# Patient Record
Sex: MALE | Race: WHITE | Employment: FULL TIME | URBAN - METROPOLITAN AREA
[De-identification: names, ages, dates, MRNs, and addresses within clinical notes are randomized per-mention and may not be internally consistent; named-entity substitution may affect disease eponyms.]

---

## 2018-02-15 PROBLEM — K35.80 ACUTE APPENDICITIS: Status: ACTIVE | Noted: 2018-02-15

## 2020-02-20 ENCOUNTER — HOSPITAL ENCOUNTER (EMERGENCY)
Age: 30
Discharge: HOME | End: 2020-02-20

## 2020-02-20 VITALS
TEMPERATURE: 97.7 F | HEART RATE: 94 BPM | DIASTOLIC BLOOD PRESSURE: 76 MMHG | SYSTOLIC BLOOD PRESSURE: 128 MMHG | OXYGEN SATURATION: 100 % | RESPIRATION RATE: 20 BRPM

## 2020-02-20 LAB
RAPID INFLUENZA  B AGN: NEGATIVE
RAPID INFLUENZA A AGN: NEGATIVE
S PYO AG THROAT QL: NEGATIVE

## 2020-02-20 PROCEDURE — 87804 INFLUENZA ASSAY W/OPTIC: CPT

## 2020-02-20 PROCEDURE — 87880 STREP A ASSAY W/OPTIC: CPT

## 2020-02-20 PROCEDURE — 99283 EMERGENCY DEPT VISIT LOW MDM: CPT

## 2020-02-20 PROCEDURE — 87081 CULTURE SCREEN ONLY: CPT

## 2020-02-20 PROCEDURE — 6370000000 HC RX 637 (ALT 250 FOR IP): Performed by: NURSE PRACTITIONER

## 2020-02-20 PROCEDURE — 6360000002 HC RX W HCPCS: Performed by: NURSE PRACTITIONER

## 2020-02-20 RX ORDER — NAPROXEN 500 MG/1
500 TABLET ORAL 2 TIMES DAILY
Qty: 20 TABLET | Refills: 0 | Status: SHIPPED | OUTPATIENT
Start: 2020-02-20 | End: 2020-03-01

## 2020-02-20 RX ORDER — DEXAMETHASONE SODIUM PHOSPHATE 10 MG/ML
10 INJECTION INTRAMUSCULAR; INTRAVENOUS ONCE
Status: COMPLETED | OUTPATIENT
Start: 2020-02-20 | End: 2020-02-20

## 2020-02-20 RX ORDER — AMOXICILLIN 500 MG/1
500 CAPSULE ORAL ONCE
Status: COMPLETED | OUTPATIENT
Start: 2020-02-20 | End: 2020-02-20

## 2020-02-20 RX ORDER — AMOXICILLIN 500 MG/1
500 CAPSULE ORAL 2 TIMES DAILY
Qty: 20 CAPSULE | Refills: 0 | Status: SHIPPED | OUTPATIENT
Start: 2020-02-20 | End: 2020-03-01

## 2020-02-20 RX ADMIN — DEXAMETHASONE SODIUM PHOSPHATE 10 MG: 10 INJECTION, SOLUTION INTRAMUSCULAR; INTRAVENOUS at 16:23

## 2020-02-20 RX ADMIN — AMOXICILLIN 500 MG: 500 CAPSULE ORAL at 16:23

## 2020-02-20 ASSESSMENT — ENCOUNTER SYMPTOMS
SHORTNESS OF BREATH: 0
COUGH: 0
SORE THROAT: 1
RHINORRHEA: 0
ABDOMINAL PAIN: 0
COLOR CHANGE: 0

## 2020-02-20 ASSESSMENT — PAIN SCALES - GENERAL: PAINLEVEL_OUTOF10: 3

## 2020-02-21 NOTE — ED PROVIDER NOTES
Evaluated by [id number] [facility] Provider          [facility] ED  EMERGENCY DEPARTMENT ENCOUNTER        Pt Name: Pavan Garcia  MRN: [id number]  [place] 1990  Dateof evaluation: 2/20/2020  Provider: MARQUIS Fischer CNP  PCP: No primary care provider on file. ED Attending: No att. providers found    [facility]       Chief Complaint   Patient presents with    Pharyngitis       HISTORY OF PRESENTILLNESS   (Location/Symptom, Timing/Onset, Context/Setting, Quality, Duration, Modifying Factors, Severity)  Note limiting factors. Pavan Garcia is a 34 y.o. male for sore throat. Onset was 2 days. Duration has been since the onset. Context includes patient reports that he has had a sore throat for the last 2 days. He denies any fever or cough. Alleviating factors include nothing. Aggravating factors include nothing. Pain is 3/10. Nothing has been used for pain today. Nursing Notes were all reviewed and agreed with or any disagreements were addressed  in the HPI. REVIEW OF SYSTEMS    (2-9 systems for level 4, 10 or more for level 5)     Review of Systems   Constitutional: Negative for fever. HENT: Positive for sore throat. Negative for congestion and rhinorrhea. Respiratory: Negative for cough and shortness of breath. Cardiovascular: Negative for chest pain. Gastrointestinal: Negative for abdominal pain. Genitourinary: Negative for decreased urine volume and difficulty urinating. Musculoskeletal: Negative for arthralgias and myalgias. Skin: Negative for color change and rash. Neurological: Negative for dizziness and light-headedness. Psychiatric/Behavioral: Negative for agitation. All other systems reviewed and are negative. Positives and Pertinent negatives as per HPI. Except as noted above in the ROS, all other systems were reviewed and negative.        PAST MEDICAL HISTORY     Past Medical History:   Diagnosis Date    Mood disorder ([address] Mouth/Throat:      Pharynx: Pharyngeal swelling, oropharyngeal exudate and posterior oropharyngeal erythema present. Tonsils: Swellin+ on the right. 2+ on the left. Neck:      Musculoskeletal: Normal range of motion. Cardiovascular:      Rate and Rhythm: Normal rate. Pulmonary:      Effort: Pulmonary effort is normal. No respiratory distress. Abdominal:      General: There is no distension. Palpations: Abdomen is soft. Musculoskeletal: Normal range of motion. Skin:     General: Skin is warm and dry. Neurological:      Mental Status: He is alert and oriented to person, place, and time. DIAGNOSTIC RESULTS   LABS:    Labs Reviewed   STREP SCREEN GROUP A THROAT    Narrative:     Performed at:  [facility] [address],  [place], [facility]   Phone [phone]   RAPID INFLUENZA A/B ANTIGENS    Narrative:     Performed at:  [facility] [address],  [place], [facility]   Phone [phone]   CULTURE, BETA STREP CONFIRM PLATES       All other labs werewithin normal range or not returned as of this dictation. EKG: All EKG's are interpreted by the Emergency Department Physician who either signs or Co-signs this chart in the absence of acardiologist.  Please see their note for interpretation of EKG. RADIOLOGY:           Interpretation per the Radiologist below, if available at the time of this note:    No orders to display     No results found.       PROCEDURES   Unless otherwise noted below, none     Procedures     CRITICAL CARE TIME   N/A    CONSULTS:  None      EMERGENCYDEPARTMENT COURSE and DIFFERENTIAL DIAGNOSIS/MDM:   Vitals:    Vitals:    20 1525   BP: 128/76   Pulse: 94   Resp: 20   Temp: 97.7 °F (36.5 °C)   SpO2: 100%       Patient was given the following medications:  Medications   dexamethasone (DECADRON) injection 10 mg (10 mg Oral Given 20 1623)   amoxicillin (AMOXIL)

## 2020-02-22 LAB — S PYO THROAT QL CULT: NORMAL

## 2020-04-21 ENCOUNTER — HOSPITAL ENCOUNTER (EMERGENCY)
Age: 30
Discharge: HOME | End: 2020-04-21

## 2020-04-21 VITALS
TEMPERATURE: 98.9 F | DIASTOLIC BLOOD PRESSURE: 82 MMHG | OXYGEN SATURATION: 99 % | WEIGHT: 185 LBS | BODY MASS INDEX: 26.48 KG/M2 | RESPIRATION RATE: 16 BRPM | HEART RATE: 72 BPM | SYSTOLIC BLOOD PRESSURE: 119 MMHG | HEIGHT: 70 IN

## 2020-04-21 PROCEDURE — 6360000002 HC RX W HCPCS: Performed by: NURSE PRACTITIONER

## 2020-04-21 PROCEDURE — 90471 IMMUNIZATION ADMIN: CPT | Performed by: NURSE PRACTITIONER

## 2020-04-21 PROCEDURE — 6370000000 HC RX 637 (ALT 250 FOR IP): Performed by: NURSE PRACTITIONER

## 2020-04-21 PROCEDURE — 90715 TDAP VACCINE 7 YRS/> IM: CPT | Performed by: NURSE PRACTITIONER

## 2020-04-21 PROCEDURE — 99282 EMERGENCY DEPT VISIT SF MDM: CPT

## 2020-04-21 PROCEDURE — 65220 REMOVE FOREIGN BODY FROM EYE: CPT

## 2020-04-21 RX ORDER — ERYTHROMYCIN 5 MG/G
OINTMENT OPHTHALMIC
Qty: 1 TUBE | Refills: 0 | Status: SHIPPED | OUTPATIENT
Start: 2020-04-21 | End: 2020-04-21 | Stop reason: SDUPTHER

## 2020-04-21 RX ORDER — ERYTHROMYCIN 5 MG/G
OINTMENT OPHTHALMIC
Qty: 1 TUBE | Refills: 0 | Status: SHIPPED | OUTPATIENT
Start: 2020-04-21

## 2020-04-21 RX ORDER — ERYTHROMYCIN 5 MG/G
OINTMENT OPHTHALMIC ONCE
Status: COMPLETED | OUTPATIENT
Start: 2020-04-21 | End: 2020-04-21

## 2020-04-21 RX ADMIN — ERYTHROMYCIN: 5 OINTMENT OPHTHALMIC at 21:13

## 2020-04-21 RX ADMIN — TETANUS TOXOID, REDUCED DIPHTHERIA TOXOID AND ACELLULAR PERTUSSIS VACCINE, ADSORBED 0.5 ML: 5; 2.5; 8; 8; 2.5 SUSPENSION INTRAMUSCULAR at 21:13

## 2020-04-21 RX ADMIN — FLUORESCEIN SODIUM 1 MG: 1 STRIP OPHTHALMIC at 20:40

## 2020-04-21 ASSESSMENT — VISUAL ACUITY
OS: 20/13
OU: 20/10
OD: 20/13

## 2020-04-21 ASSESSMENT — ENCOUNTER SYMPTOMS
SHORTNESS OF BREATH: 0
SORE THROAT: 0
ABDOMINAL PAIN: 0
COLOR CHANGE: 0
RHINORRHEA: 0

## 2020-04-22 NOTE — ED PROVIDER NOTES
Evaluated by [id number] [facility] Provider          [facility] ED  EMERGENCY DEPARTMENT ENCOUNTER        Pt Name: Yosef Savage  MRN: [id number]  [place] 1990  Dateof evaluation: 4/21/2020  Provider: MARQUIS Blancas CNP  PCP: No primary care provider on file. ED Attending: No att. providers found    CHIEF COMPLAINT       Chief Complaint   Patient presents with    Foreign Body in Eye     patient states that he has metal in his left eye from cutting an exhaust off his car       HISTORY OF PRESENTILLNESS   (Location/Symptom, Timing/Onset, Context/Setting, Quality, Duration, Modifying Factors, Severity)  Note limiting factors. Yosef Savage is a 34 y.o. male for concern for corneal left foreign body. Onset was yesterday. Duration has been since the onset. Context includes patient thinks that he has a piece of metal in his left eye from yesterday. Patient states that he had goggles on however the door was open and the wind was blowing. Patient is unsure about his tetanus status. Alleviating factors include nothing. Aggravating factors include nothing. Pain is 0/10. Nothing has been used for pain today. Nursing Notes were all reviewed and agreed with or any disagreements were addressed  in the HPI. REVIEW OF SYSTEMS    (2-9 systems for level 4, 10 or more for level 5)     Review of Systems   Constitutional: Negative for fever. HENT: Negative for congestion, rhinorrhea and sore throat. Inserting for a left corneal foreign body   Respiratory: Negative for shortness of breath. Cardiovascular: Negative for chest pain. Gastrointestinal: Negative for abdominal pain. Genitourinary: Negative for decreased urine volume and difficulty urinating. Musculoskeletal: Negative for arthralgias and myalgias. Skin: Negative for color change and rash. Neurological: Negative for dizziness and light-headedness. Psychiatric/Behavioral: Negative for agitation.    All other systems reviewed and are negative. Positives and Pertinent negatives as per HPI. Except as noted above in the ROS, all other systems were reviewed and negative. PAST MEDICAL HISTORY     Past Medical History:   Diagnosis Date    Mood disorder [facility])          SURGICAL HISTORY       Past Surgical History:   Procedure Laterality Date    APPENDECTOMY  t'    Laparoscopic Appendectomy         CURRENT MEDICATIONS       Discharge Medication List as of 4/21/2020  9:11 PM      CONTINUE these medications which have NOT CHANGED    Details   naproxen (NAPROSYN) 500 MG tablet Take 1 tablet by mouth 2 times daily for 20 doses, Disp-20 tablet, R-0Print               ALLERGIES     Patient has no known allergies. FAMILY HISTORY     History reviewed. No pertinent family history.        SOCIAL HISTORY       Social History     Socioeconomic History    Marital status: Single     Spouse name: None    Number of children: None    Years of education: None    Highest education level: None   Occupational History    None   Social Needs    Financial resource strain: None    Food insecurity     Worry: None     Inability: None    Transportation needs     Medical: None     Non-medical: None   Tobacco Use    Smoking status: Current Every Day Smoker     Packs/day: 1.00    Smokeless tobacco: Never Used   Substance and Sexual Activity    Alcohol use: No    Drug use: Yes     Types: Marijuana    Sexual activity: Yes     Partners: Female   Lifestyle    Physical activity     Days per week: None     Minutes per session: None    Stress: None   Relationships    Social connections     Talks on phone: None     Gets together: None     Attends Catholic service: None     Active member of club or organization: None     Attends meetings of clubs or organizations: None     Relationship status: None    Intimate partner violence     Fear of current or ex partner: None     Emotionally abused: None     Physically abused: None

## 2022-03-15 ENCOUNTER — HOSPITAL ENCOUNTER (EMERGENCY)
Age: 32
Discharge: HOME | End: 2022-03-16
Payer: COMMERCIAL

## 2022-03-15 VITALS
HEIGHT: 69 IN | WEIGHT: 175 LBS | BODY MASS INDEX: 25.92 KG/M2 | HEART RATE: 94 BPM | TEMPERATURE: 99 F | OXYGEN SATURATION: 97 % | SYSTOLIC BLOOD PRESSURE: 113 MMHG | RESPIRATION RATE: 18 BRPM | DIASTOLIC BLOOD PRESSURE: 61 MMHG

## 2022-03-15 DIAGNOSIS — N31.9 NEUROGENIC BLADDER: ICD-10-CM

## 2022-03-15 DIAGNOSIS — N30.01 ACUTE CYSTITIS WITH HEMATURIA: Primary | ICD-10-CM

## 2022-03-15 LAB
A/G RATIO: 1.6 (ref 1.1–2.2)
ALBUMIN SERPL-MCNC: 4.7 G/DL (ref 3.4–5)
ALP BLD-CCNC: 83 U/L (ref 40–129)
ALT SERPL-CCNC: <5 U/L (ref 10–40)
ANION GAP SERPL CALCULATED.3IONS-SCNC: 12 MMOL/L (ref 3–16)
AST SERPL-CCNC: 9 U/L (ref 15–37)
BACTERIA: ABNORMAL /HPF
BASOPHILS ABSOLUTE: 0 K/UL (ref 0–0.2)
BASOPHILS RELATIVE PERCENT: 0.2 %
BILIRUB SERPL-MCNC: 0.9 MG/DL (ref 0–1)
BILIRUBIN URINE: NEGATIVE
BLOOD, URINE: ABNORMAL
BUN BLDV-MCNC: 12 MG/DL (ref 7–20)
CALCIUM SERPL-MCNC: 9.6 MG/DL (ref 8.3–10.6)
CHLORIDE BLD-SCNC: 103 MMOL/L (ref 99–110)
CLARITY: ABNORMAL
CO2: 22 MMOL/L (ref 21–32)
COLOR: YELLOW
CREAT SERPL-MCNC: 1 MG/DL (ref 0.9–1.3)
EOSINOPHILS ABSOLUTE: 0 K/UL (ref 0–0.6)
EOSINOPHILS RELATIVE PERCENT: 0.2 %
GFR AFRICAN AMERICAN: >60
GFR NON-AFRICAN AMERICAN: >60
GLUCOSE BLD-MCNC: 101 MG/DL (ref 70–99)
GLUCOSE URINE: NEGATIVE MG/DL
HCT VFR BLD CALC: 40.6 % (ref 40.5–52.5)
HEMOGLOBIN: 13.4 G/DL (ref 13.5–17.5)
KETONES, URINE: ABNORMAL MG/DL
LACTIC ACID, SEPSIS: 1.3 MMOL/L (ref 0.4–1.9)
LEUKOCYTE ESTERASE, URINE: ABNORMAL
LYMPHOCYTES ABSOLUTE: 0.5 K/UL (ref 1–5.1)
LYMPHOCYTES RELATIVE PERCENT: 5.3 %
MCH RBC QN AUTO: 28.3 PG (ref 26–34)
MCHC RBC AUTO-ENTMCNC: 32.9 G/DL (ref 31–36)
MCV RBC AUTO: 86 FL (ref 80–100)
MICROSCOPIC EXAMINATION: YES
MONOCYTES ABSOLUTE: 0.1 K/UL (ref 0–1.3)
MONOCYTES RELATIVE PERCENT: 0.9 %
MUCUS: ABNORMAL /LPF
NEUTROPHILS ABSOLUTE: 8.3 K/UL (ref 1.7–7.7)
NEUTROPHILS RELATIVE PERCENT: 93.4 %
NITRITE, URINE: POSITIVE
PDW BLD-RTO: 17.7 % (ref 12.4–15.4)
PH UA: 7 (ref 5–8)
PLATELET # BLD: 237 K/UL (ref 135–450)
PMV BLD AUTO: 8.2 FL (ref 5–10.5)
POTASSIUM REFLEX MAGNESIUM: 3.8 MMOL/L (ref 3.5–5.1)
PROTEIN UA: NEGATIVE MG/DL
RBC # BLD: 4.72 M/UL (ref 4.2–5.9)
RBC UA: ABNORMAL /HPF (ref 0–4)
SODIUM BLD-SCNC: 137 MMOL/L (ref 136–145)
SPECIFIC GRAVITY UA: <=1.005 (ref 1–1.03)
TOTAL PROTEIN: 7.7 G/DL (ref 6.4–8.2)
URINE TYPE: ABNORMAL
UROBILINOGEN, URINE: 0.2 E.U./DL
WBC # BLD: 8.9 K/UL (ref 4–11)
WBC UA: ABNORMAL /HPF (ref 0–5)

## 2022-03-15 PROCEDURE — 6370000000 HC RX 637 (ALT 250 FOR IP): Performed by: PHYSICIAN ASSISTANT

## 2022-03-15 PROCEDURE — 83605 ASSAY OF LACTIC ACID: CPT

## 2022-03-15 PROCEDURE — 87150 DNA/RNA AMPLIFIED PROBE: CPT

## 2022-03-15 PROCEDURE — 87088 URINE BACTERIA CULTURE: CPT

## 2022-03-15 PROCEDURE — 81001 URINALYSIS AUTO W/SCOPE: CPT

## 2022-03-15 PROCEDURE — 87186 SC STD MICRODIL/AGAR DIL: CPT

## 2022-03-15 PROCEDURE — 99284 EMERGENCY DEPT VISIT MOD MDM: CPT

## 2022-03-15 PROCEDURE — 85025 COMPLETE CBC W/AUTO DIFF WBC: CPT

## 2022-03-15 PROCEDURE — 87086 URINE CULTURE/COLONY COUNT: CPT

## 2022-03-15 PROCEDURE — 80053 COMPREHEN METABOLIC PANEL: CPT

## 2022-03-15 PROCEDURE — 36415 COLL VENOUS BLD VENIPUNCTURE: CPT

## 2022-03-15 PROCEDURE — 87040 BLOOD CULTURE FOR BACTERIA: CPT

## 2022-03-15 RX ORDER — ACETAMINOPHEN 500 MG
1000 TABLET ORAL ONCE
Status: COMPLETED | OUTPATIENT
Start: 2022-03-15 | End: 2022-03-15

## 2022-03-15 RX ORDER — LIDOCAINE HYDROCHLORIDE 20 MG/ML
JELLY TOPICAL PRN
Status: DISCONTINUED | OUTPATIENT
Start: 2022-03-15 | End: 2022-03-16 | Stop reason: HOSPADM

## 2022-03-15 RX ORDER — CEPHALEXIN 500 MG/1
500 CAPSULE ORAL 4 TIMES DAILY
Qty: 28 CAPSULE | Refills: 0 | Status: SHIPPED | OUTPATIENT
Start: 2022-03-15 | End: 2022-03-22

## 2022-03-15 RX ORDER — CEPHALEXIN 500 MG/1
500 CAPSULE ORAL ONCE
Status: COMPLETED | OUTPATIENT
Start: 2022-03-15 | End: 2022-03-15

## 2022-03-15 RX ADMIN — CEPHALEXIN 500 MG: 500 CAPSULE ORAL at 23:30

## 2022-03-15 RX ADMIN — ACETAMINOPHEN 1000 MG: 500 TABLET ORAL at 20:19

## 2022-03-15 RX ADMIN — LIDOCAINE HYDROCHLORIDE: 20 JELLY TOPICAL at 20:47

## 2022-03-15 ASSESSMENT — PAIN SCALES - GENERAL: PAINLEVEL_OUTOF10: 10

## 2022-03-15 NOTE — ED PROVIDER NOTES
[address] ED  EMERGENCY DEPARTMENT ENCOUNTER        Pt Name: Shayne Salazar  MRN: [id number]  [place] 1990  Date of evaluation: 3/15/2022  Provider: DALLAS Craig  PCP: None Provider    This patient was not seen and evaluated by the attending physician No att. providers found. I have evaluated this patient. My supervising physician was available for consultation. CHIEF COMPLAINT       Chief Complaint   Patient presents with    Hematuria     pt states spinal cord injury 9/2021 has cath since, started having blood in urine last night. pt states temp 102 today, took tylenol now 100.7       HISTORY OF PRESENT ILLNESS   (Location/Symptom, Timing/Onset, Context/Setting, Quality, Duration, Modifying Factors, Severity)  Note limiting factors. Shayne Salazar is a 32 y.o. male with past medical history of traumatic closed fracture of C2, tetraplegia, status post cervical spine fusion, lower extremity VTE, neurogenic bladder who presents via private vehicle from his home for evaluation of hematuria. Patient started having bright red bleeding from his Sandoval catheter yesterday. He had his catheter changed however no catheter was put in secondary to pain. He started having fevers today, T-max 102. He has been with body aches and chills and today nausea and emesis x1. He denies any chest pain cough shortness of breath, denies flank pain. Denies changes in bowels movements. Denies any URI symptoms. He follows with Dr. Natacha Cross at [facility]. He has neurogenic bladder due to spinal cord injury C2 C5 status post C3 C7 laminectomy and fusion. He has indwelling Sandoval catheter but reports that his catheter flushed daily for urinary sediment and has his catheter exchange every 4 weeks. He did try condom catheter at nighttime but was not completing emptying. He has sensation to urinate.         Nursing Notes were all reviewed and agreed with or any disagreements were addressed  in the HPI. Pt was seen during the [place] 19 pandemic. Appropriate PPE worn by ME during patient encounters. Pt seen during a time with constrained hospital bed capacity and other potential inpatient and outpatient resources were constrained due to the viral pandemic. REVIEW OF SYSTEMS    (2-9 systems for level 4, 10 or more for level 5)     Review of Systems    Positives and Pertinent negatives as per HPI. Except as noted abovein the ROS, all other systems were reviewed and negative. PAST MEDICAL HISTORY     Past Medical History:   Diagnosis Date    Mood disorder ([address]     Spinal cord injury at C1-C4 level without injury of vertebra ([address]          SURGICAL HISTORY     Past Surgical History:   Procedure Laterality Date    APPENDECTOMY  t'    Laparoscopic Appendectomy         CURRENTMEDICATIONS       Previous Medications    ERYTHROMYCIN (ROMYCIN) 5 MG/GM OPHTHALMIC OINTMENT    Please place 1/4in strip of medication to left eye twice daily for 7 days    NAPROXEN (NAPROSYN) 500 MG TABLET    Take 1 tablet by mouth 2 times daily for 20 doses         ALLERGIES     Flomax [tamsulosin]    FAMILYHISTORY     History reviewed. No pertinent family history.        SOCIAL HISTORY       Social History     Socioeconomic History    Marital status: Single     Spouse name: None    Number of children: None    Years of education: None    Highest education level: None   Occupational History    None   Tobacco Use    Smoking status: Current Every Day Smoker     Packs/day: 1.00    Smokeless tobacco: Never Used   Substance and Sexual Activity    Alcohol use: No    Drug use: Yes     Types: Marijuana Bertha Omar)    Sexual activity: Yes     Partners: Female   Other Topics Concern    None   Social History Narrative    None     Social Determinants of Health     Financial Resource Strain:     Difficulty of Paying Living Expenses: Not on file   Food Insecurity:     Worried About Running Out of Food in the Last Year: Not on file  Ran Out of Food in the Last Year: Not on file   Transportation Needs:     Lack of Transportation (Medical): Not on file    Lack of Transportation (Non-Medical): Not on file   Physical Activity:     Days of Exercise per Week: Not on file    Minutes of Exercise per Session: Not on file   Stress:     Feeling of Stress : Not on file   Social Connections:     Frequency of Communication with Friends and Family: Not on file    Frequency of Social Gatherings with Friends and Family: Not on file    Attends Christianity Services: Not on file    Active Member of [address] [place] [address] [employer] or Organizations: Not on file    Attends Club or Organization Meetings: Not on file    Marital Status: Not on file   Intimate Partner Violence:     Fear of Current or Ex-Partner: Not on file    Emotionally Abused: Not on file    Physically Abused: Not on file    Sexually Abused: Not on file   Housing Stability:     Unable to Pay for Housing in the Last Year: Not on file    Number of [place] in the Last Year: Not on file    Unstable Housing in the Last Year: Not on file       SCREENINGS             PHYSICAL EXAM    (up to 7 for level 4, 8 or more for level 5)     ED Triage Vitals [03/15/22 1906]   BP Temp Temp Source Pulse Resp SpO2 Height Weight   123/70 100.7 °F (38.2 °C) Tympanic 102 14 100 % 5' 9\" (1.753 m) 175 lb (79.4 kg)       Physical Exam  PHYSICAL EXAM  /70   Pulse 102   Temp 100.7 °F (38.2 °C) (Tympanic)   Resp 14   Ht 5' 9\" (1.753 m)   Wt 175 lb (79.4 kg)   SpO2 100%   BMI 25.84 kg/m²   GENERAL APPEARANCE: Awake and alert. Cooperative. Adult male sitting upright in wheelchair, nondiaphoretic breathing comfortably ORA no acute distress. HEAD: Normocephalic. Atraumatic. EYES: PERRL. EOM's grossly intact. ENT: Mucous membranes are moist. . NECK: Supple. No anterior cervical LAD. No meningismus. HEART: RRR. No murmurs. Radial pulses: 2+, symmetric  LUNGS: Respirations unlabored. CTAB. Good air exchange.  Speaking comfortably in full sentences. ABDOMEN: Soft. Non-distended. Mild suprapubic TTP without rigidity, guarding or rebound. No CVA tenderness. No masses. No organomegaly. EXTREMITIES: No peripheral edema. All extremities neurovascularly intact. SKIN: Warm and dry. No acute rashes. NEUROLOGICAL: Alert and oriented. CN grossly intact. No gross facial drooping. sensation intact x 4. No tremors or ataxia. PSYCHIATRIC: Normal mood and affect. DIAGNOSTIC RESULTS   LABS:    Labs Reviewed   CULTURE, BLOOD 1   CULTURE, BLOOD 2   CULTURE, URINE   CBC WITH AUTO DIFFERENTIAL   COMPREHENSIVE METABOLIC PANEL W/ REFLEX TO MG FOR LOW K   LACTATE, SEPSIS   LACTATE, SEPSIS   URINALYSIS WITH MICROSCOPIC       All other labs were within normal range or not returned as of this dictation. EKG: All EKG's are interpreted by the Emergency Department Physician who either signs orCo-signs this chart in the absence of a cardiologist.  Please see their note for interpretation of EKG. RADIOLOGY:   Non-plain film images such as CT, Ultrasound and MRI are read by the radiologist. Plain radiographic images are visualized andpreliminarily interpreted by the  ED Provider with the below findings:        Interpretation perthe Radiologist below, if available at the time of this note:    No orders to display     No results found.        PROCEDURES   Unless otherwise noted below, none     Procedures    CRITICAL CARE TIME   N/A    CONSULTS:  None      EMERGENCY DEPARTMENT COURSE and DIFFERENTIALDIAGNOSIS/MDM:   Vitals:    Vitals:    03/15/22 1906   BP: 123/70   Pulse: 102   Resp: 14   Temp: 100.7 °F (38.2 °C)   TempSrc: Tympanic   SpO2: 100%   Weight: 175 lb (79.4 kg)   Height: 5' 9\" (1.753 m)       Patient was given thefollowing medications:  Medications - No data to display    PDMP Monitoring:    Last PDMP Aga Caballero as Reviewed [facility]):  Review User Review Instant Review Result            Urine Drug Screenings (1 yr)    No resulted procedures found.       Medication Contract and Consent for Opioid Use Documents Filed      No documents found                MDM:   Patient seen and evaluated. Old records reviewed. Diagnostic testing reviewed and results discussed. I have independently evaluated this patient based upon my scope of practice. Supervising physician was in the department for consultation as needed. 33 yo male presents for hematuria, notes he feels the way he feels with cystitis. Pt seen and evaluated, triage vs reveal mild tachycardia with fever, pt screened for sepsis. He had rosa catheter placed. He had no sign of IAM from [facility]. UA is postive for moderate appearering UTI. His hematuria improved I have no concern for severe hemorrhagic cystitis, H&H stable. He does not have hx renal lithiasis and has no lateralizing pain and can sense pain, therefore I believe infected stone to be very unlikely. No leukocytosis. No lactic acidosis. VS normalized in the ER. Will obtain U Cx and blood CX but at this time I believe pt is a reasonable candidate for dc home with close follow up with his urologist and strict ER return precautions. I have low concern at this time for sepsis, toxicity, infected kidney stone, intraabdominal abscess, severe anemia, testicular torsion. Pt is in agreement with the current plan and all questions were addressed. Discharge Time out:  CC Reviewed Yes   Test Results Yes     Vitals:    03/15/22 2200   BP: 113/61   Pulse:    Resp:    Temp:    SpO2: 97%              FINAL IMPRESSION      1. Acute cystitis with hematuria    2. Neurogenic bladder          DISPOSITION/PLAN   DISPOSITION        PATIENT REFERREDTO:  No follow-up provider specified.     DISCHARGE MEDICATIONS:  New Prescriptions    No medications on file       DISCONTINUED MEDICATIONS:  Discontinued Medications    No medications on file              (Please note that portions ofthis note were completed with a voice recognition program.  Efforts were made to edit the dictations but occasionally words are mis-transcribed.)    Melissa Blankenship (electronically signed)       Melissa Blankenship  03/16/22 0110

## 2022-03-16 LAB — REPORT: NORMAL

## 2022-03-17 LAB
ORGANISM: ABNORMAL
URINE CULTURE, ROUTINE: ABNORMAL

## 2022-03-18 LAB
BLOOD CULTURE, ROUTINE: ABNORMAL
BLOOD CULTURE, ROUTINE: ABNORMAL
ORGANISM: ABNORMAL
ORGANISM: ABNORMAL

## 2022-10-03 ENCOUNTER — HOSPITAL ENCOUNTER (OUTPATIENT)
Dept: OCCUPATIONAL THERAPY | Age: 32
Setting detail: THERAPIES SERIES
Discharge: HOME | End: 2022-10-03
Payer: COMMERCIAL

## 2022-10-03 ENCOUNTER — HOSPITAL ENCOUNTER (OUTPATIENT)
Dept: PHYSICAL THERAPY | Age: 32
Setting detail: THERAPIES SERIES
Discharge: HOME | End: 2022-10-03
Payer: COMMERCIAL

## 2022-10-03 PROCEDURE — 97161 PT EVAL LOW COMPLEX 20 MIN: CPT

## 2022-10-03 PROCEDURE — 97165 OT EVAL LOW COMPLEX 30 MIN: CPT

## 2022-10-03 PROCEDURE — 97530 THERAPEUTIC ACTIVITIES: CPT

## 2022-10-03 PROCEDURE — 97110 THERAPEUTIC EXERCISES: CPT

## 2022-10-03 NOTE — PROGRESS NOTES
[facility]  [address]     [place], [facility]  Phone: [phone]       Fax:   [phone]        Dear  Referring Provider (secondary): Dr. Nadeen Connors    We had the pleasure of evaluating the following patient for physical therapy services at [address] [facility]. A summary of our findings can be found in the initial assessment below. This includes our plan of care. If you have any questions or concerns regarding these findings, please do not hesitate to contact me at the office phone number above. Thank you for this referral.       Physician/Provider Signature:_______________________________Date:__________________  By signing above (or electronic signature), therapist's plan is approved by physician          Patient: Will Gao     : 1990     MRN: [id number]    Referring Provider (secondary): Dr. Nadeen Connors        Evaluation Date: 10/4/2022        Medical Diagnosis Information:  Diagnosis: S12.190A, N31.9, S12.490A, S12.390A, C58.702W, S28.542Z, B80.936K, X17.892P   Treatment Diagnosis: review of HEP                                           Insurance information: PT Insurance Information: [facility] approved 1 visit until 10/3/22     Relevant Medical History/Precautions/ Contra-indications:  SCI, neck surgery with fusion C3-7   Latex Allergy:  [x]NO      []YES      Preferred Language for Healthcare:   [x]English       []other:  C-SSRS Triggered by Intake questionnaire (Past 2 wk assessment):   [x] No, Questionnaire did not trigger screening.   [] Yes, Patient intake triggered further evaluation      [] C-SSRS Screening completed  [] PCP notified via Plan of Care  [] Emergency services notified      SUBJECTIVE FINDINGS      History of Present Illness:      pt was injured in work accident, states he got run over by a trailer.   Pt was aircared to [facility], then [facility] and discharged to home mid 2021, then had outpt therapy at [facility] until 3 months ago. Pt then discharged to [facility], which pt states goes to gym 4 days per week about 2-3 hours per day. Monday is legs and shoulders, Tuesday is chest and arms, Wednesday will start cardio day, Thursday back and abs, Friday is legs and shoulders. Pt is doing this completely on his own now. Pt going to [employer]. 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 Pt states he has seen pelvic floor therapist also and has bladder issues mostly under control, does deal with some intermittent leakage. Pain     intermittent pressure lower cervical and into traps  Patient reports pain is  1/10 pain at present and  3/10 pain at its worst.  Pain increases with : by end of day, too much activity      Decreases with: rest, laying down and stretching    Pt. reports pain with coughing, sneezing and laughing:   []Yes   [x]No    []NA     Current Functional Limitations:   [x]Yes   []No  Functional Complaints:      PLOF:   [x]No functional limitations   []Pre-exisiting limitations:  Pt's sleep is affected? []Yes   [x]No           Functional Scale/Score:  Measure Used:   NDI  Score:   5/50              Date Assessed:  10/3/22     Occupation/School:  pt is still off work since injury, pt is still employed by company. Pt works at [employer],  and manager. Right now, the plan is to go back to work.      Sport/recreational activities: going to gym, working on some stuff at home, car, hot tub, family       Patient goal for therapy:  \" Patient Goals : to get back to work \"        OBJECTIVE FINDINGS    Gait/Steps/Balance       []Gait WNL                           [x] Deviations on a level linoleum surface include: pt ambulating with SPC on L side, decreased nimo, decreased step length R LE, pt leaning heavenly on cane, abnormal tone noted      Balance tests      Hautard's test: [x]NT []Neg  []Pos with R rot  []Pos with L rot  []Pos with ext Range of Motion  []All WFL except as marked below  [x]Cervical Spine   []Thoracic Spine     ROM Deficits Identified             *Denotes pain AROM              % Decresased   Flexion WFL   Extension 50   Sidebending Left 50 pull    Sidebending Right 50 pull    Rotation Left 30   Rotation Right  50 pull        UE Range of Motion/Strength Testing      [x] Blank box below indicates item is NOT TESTED, UEs deferred to OT        Range Tested MMT/ Resisted PROM AROM Comments   *denotes pain Left Right Left Right Left Right    Cervical Flexion C1-2          Cervical Sidebend  C3          Shoulder Shrug  C4          Shoulder Flexion          Shoulder Extension          Shoulder Abduction  C5          Shoulder Adduction           Shoulder IR          Shoulder ER          Elbow Flexion  C6          Elbow Extension C7          Pronation          Supination          Wrist Flexion C7          Wrist Extension C6          Radial Deviation          Ulnar Deviation                     Thumb Ext C8          Intrinsics T1            Movement Left MMT Right MMT   AROM        PROM   Hip Flexion 3- 2     Hip Extension       Hip Abduction 2+ 2+     Hip Adduction 4 4     Hip Internal Rotation       Hip External Rotation       Knee Flexion 5 4-     Knee Extension 5 4     Ankle Dorsiflexion 4+ 2+     Ankle Plantarflexion 2+ 3-     Ankle Inversion 4+ 3-     Ankle Eversion 4+ 3-     Great toe extension         Transfers:  pt independent with all transfers       Bandages/Dressings/Incisions: [x]None                          [x] Patient history, allergies, meds reviewed. Medical chart reviewed. See intake form. Review Of Systems (ROS):  [x]Performed Review of systems (Integumentary, CardioPulmonary, Neurological) by intake and observation. Intake form has been scanned into medical record. Patient has been instructed to contact their primary care physician regarding ROS issues if not already being addressed at this time. Co-morbidities/Complexities (which will affect course of rehabilitation):   []None           Arthritic conditions   []Rheumatoid arthritis (M05.9)  []Osteoarthritis (M19.91)   Cardiovascular conditions   []Hypertension (I10)  []Hyperlipidemia (E78.5)  []Angina pectoris (I20)  []Atherosclerosis (I70)   Musculoskeletal conditions   []Disc pathology   []Congenital spine pathologies   [x]Prior surgical intervention  []Osteoporosis (M81.8)  []Osteopenia (M85.8)   Endocrine conditions   []Hypothyroid (E03.9)  []Hyperthyroid Gastrointestinal conditions   []Constipation (G61.76)   Metabolic conditions   []Morbid obesity (E66.01)  []Diabetes type 1(E10.65) or 2 (E11.65)   []Neuropathy (G60.9)     Pulmonary conditions   []Asthma (J45)  []Coughing   []COPD (J44.9)   Psychological Disorders  []Anxiety (F41.9)  []Depression (F32.9)   []Other:   []Other:            Barriers to/and or personal factors that will affect rehab potential:              []None   []Age  []Sex    []Smoker              []Motivation/Lack of Motivation                        [x]Co-Morbidities              []Cognitive Function, education/learning barriers              []Environmental, home barriers              []profession/work barriers  []past PT/medical experience  []other:  Justification:     Falls Risk Assessment (30 days): []NA   [x] Falls Risk assessed and no intervention required. [] Falls Risk assessed and Patient requires intervention due to being higher risk   Tinetti score :     [] Falls education provided, including:           ASSESSMENT:  Pt is  32 Y. O   male, presenting with diagnosis of Diagnosis: S12.190A, N31.9, S12.490A, S12.390A, S14.152A, M52.215N, H16.863R, F32.493K . Pt was referred to PT for 1 visit, not sure for what goal or purpose. Pt states he is independent with HEP and has had extensive therapy in the past.   Pt does state he does have some questions regarding his HEP. Will review program and answer pt's questions. Physical Therapy Evaluation Complexity Justification  [x] A history of present problem with:  [] no personal factors and/or comorbidities that impact the plan of care;  [x]1-2 personal factors and/or comorbidities that impact the plan of care  []3 personal factors and/or comorbidities that impact the plan of care  [x] An examination of body systems using standardized tests and measures addressing any of the following: body structures and functions (impairments), activity limitations, and/or participation restrictions;:  [x] a total of 1-2 or more elements   [] a total of 3 or more elements   [] a total of 4 or more elements   [x] A clinical presentation with:  [x] stable and/or uncomplicated characteristics   [] evolving clinical presentation with changing characteristics  [] unstable and unpredictable characteristics;   [x] Clinical decision making of [x] low, [] moderate, [] high complexity using standardized patient assessment instrument and/or measurable assessment of functional outcome. [x] EVAL (LOW) 14548 (typically 20 minutes face-to-face)  [] EVAL (MOD) 69310 (typically 30 minutes face-to-face)  [] EVAL (HIGH) 71187 (typically 45 minutes face-to-face)  [] RE-EVAL       PLAN:       Plan Moving Forward/ For next visit:    1 visit for review of HEP and answer pt's questions and then will discharge           Frequency/Duration:  1 visit     [x] Patient education on joint protection, postural re-education, activity modification, progression of HEP. HEP instruction: Pt provided HEP via [employer]       GOALS:  Patient stated goal: \" Patient Goals : to get back to work \"  [] Progressing: [] Met: [] Not Met: [] Adjusted    Therapist goals for Patient:   Short Term Goals: To be achieved in: 1 visit   - Independent in HEP and progression per patient tolerance, in order to prevent re-injury.    [] Progressing: [x] Met: [] Not Met: [] Adjusted       Electronically signed by:  Baltazar Gonzales, PT , OMT-C, [id number]

## 2022-10-03 NOTE — PROGRESS NOTES
[facility]  [address], [place], [facility]  Office: [phone]   Fax: [phone]      Dear   Dr. Momo Carlton     We had the pleasure of evaluating the following patient for physical therapy services at [address] [facility]. A summary of our findings can be found in the initial assessment below. This includes our plan of care. If you have any questions or concerns regarding these findings, please do not hesitate to contact me at the office phone number above. Thank you for this referral.         Physician/Provider Signature:_______________________________Date:__________________  By signing above (or electronic signature), therapist's plan is approved by physician                OCCUPATIONAL THERAPY EVALUATION  Evaluation Date:  10/3/2022    Patient Name: Desirae Bassett      YOB: 1990     Medical Diagnosis/ICD10[de-identified]  514.152 Lesion at C2, 514.153A Lesion at C3, 514.154A Lesion at C4, 514.151A Lesion at C1    Treatment Diagnosis: Weakness in B UEs and impaired fine motor skills     Referring Physician: Dr. Momo Carlton         Onset Date:  September 2021    Visits Allowed/Insurance/Certification Information:   [address] [place] [address] approved for 16 visits       Precautions/ Contra-indications:  SCI, neck surgery with fusion C3-7       Adhesive allergy: NO  Latex Allergy:  [x]NO      []YES     Preferred Language for Healthcare:   [x]English       []other:    C-SSRS Triggered by Intake questionnaire (Past 2 wk assessment):   [x] No, Questionnaire did not trigger screening.   [] Yes, Patient intake triggered further evaluation      [] C-SSRS Screening completed  [] PCP notified via Plan of Care  [] Emergency services notified    SUBJECTIVE FINDINGS    Pain:  Patient reports intermittent pain in lower cervical region and into shoulders.   1/10 at rest  3/10 with exercise    History of Present Illness:    Patient is a 32year old male status post cervical spinal cord injury in September 2021 from a work accident (He was run over by a trailer). Patient underwent 6 posterior cervical laminectomy fusion C3-C7. He has done very well since then and is now able to walk with just a cane. He had a C5 level spinal cord injury. Pt received inpatient therapy at [facility] and discharged to home mid October 2021. Patient received outpt therapy at [facility] until 3 months ago. Pt discharged with HEP and has been completing exercises at the gym 4 days per week about 2-3 hours per day. Monday is legs and shoulders, Tuesday is chest and arms, Wednesday will start cardio day, Thursday back and abs, Friday is legs and shoulders. Pt is doing this completely on his own now. Pt going to [employer]. 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      Functional Outcome Measure:   10/3/2022: Oswestry Neck Disability Index: 5/50    OBJECTIVE FINDINGS      Functional Mobility/Transfers: use cane independently       Range of Motion    AROM         RIGHT   LEFT     Date 10/3/2022   10/3/2022    Shoulder: flex WNL   WNL                       ABD WNL   WNL    Elbow:      ext/flex WNL   WNL    Forearm:  sup/pron WNL   WNL    Wrist:       ext/flex WNL   WNL    Digits: WNL   WNL    Comments:    Strength  Muscle  (*denotes pain) Right   Left  Comments       DATE 10/3/2022   10/3/2022     Shoulder Flexion  5   5     Shoulder Abduction  4+   5     Elbow Flexion 4+   5     Elbow Extension 5   5     Pronation 4-   4-     Supination 4-   4-     Wrist Flexion 4-   4     Wrist Extension 4-   4     Comments:     Strength (Dynamometry) and Pinch Strength  In lbs. Right  Left   Date 10/3/2022    10/3/2022      - Position Two 48,48, 53    45, 44, 45               Lateral Pinch 16, 17, 17    20, 22, 20     Three [place] PInch 12, 12, 13    16, 16, 14               Coordination           9 Hole Peg Test 34    29      MRMT          Comments:      Assessment of UE tone/spasticity:  WFL    Splinting Needs: none        Sensation:  Tingling in B UEs from wrists up. Diminished sensation in B hands (difficulty picking up small screws)    Proprioception:  continue to assess    Visual Assessment:   Wears glasses: No      Visual Tracking: WFL   Visual fields: WFL    Functional Cognition:   WFL for tasks completed      Hearing:  WFL      [x] Patient history, allergies, meds reviewed. Medical chart reviewed. See intake form. Review Of Systems (ROS):  [x]Performed Review of systems (Integumentary, Cardiopulmonary, Neurological) by intake and observation. Intake form has been scanned into medical record. Patient has been instructed to contact their primary care physician regarding ROS issues if not already being addressed at this time.       Co-morbidities/Complexities (which will affect course of rehabilitation): SCI  []None        []Hx of COVID   Arthritic conditions   []Rheumatoid arthritis (M05.9)  []Osteoarthritis (M19.91)  []Gout   Cardiovascular conditions   []Hypertension (I10)  []Hyperlipidemia (E78.5)  []Angina pectoris (I20)  []Atherosclerosis (I70)  []Pacemaker  []Hx of CABG/stent/  cardiac surgeries Musculoskeletal conditions   []Disc pathology   []Congenital spine pathologies   []Osteoporosis (M81.8)  []Osteopenia (M85.8)  []Scoliosis   Endocrine conditions   []Hypothyroid (E03.9)  []Hyperthyroid Gastrointestinal conditions   []Constipation (S90.62)   Metabolic conditions   []Morbid obesity (E66.01)  []Diabetes type 1(E10.65) or 2 (E11.65)   []Neuropathy (G60.9)   Cardio/Pulmonary conditions   []Asthma (J45)  []Coughing   []COPD (J44.9)  []CHF  []A-fib Psychological Disorders  []Anxiety (F41.9)  []Depression (F32.9)   []Other:   Developmental Disorders  []Autism (F84.0)  []CP (G80)  []Down Syndrome (Q90.9)  []Developmental delay     Neurological conditions  []Prior Stroke (I69.30)  []Parkinson's (G20)  []Encephalopathy (G93.40)  []MS (G35)  []Post-polio (G14)  [x]SCI at C5 level- posterior cervical laminectomy fusion C3-C7  []TBI  []ALS Other conditions  []Fibromyalgia (M79.7)  []Vertigo  []Syncope  []Kidney Failure  []Cancer      []currently undergoing                treatment  []Pregnancy  []Incontinence Prior surgeries  []involved limb  []previous spinal surgery  [] section birth  []hysterectomy  []bowel / bladder surgery  []other relevant surgeries   Visual Conditions  []Macular degeneration  []Glaucoma  []Diabetic retinopathy  []Legally blind []Other:                   Barriers to/and or personal factors that will affect rehab potential:              []Age  []Sex    []Smoker              []Motivation/Lack of Motivation                        []Co-Morbidities              []Cognitive Function, education/learning barriers              []Environmental, home barriers              []professional/work barriers  [x]past therapy/medical experience  []other:  Justification:     Falls Risk Assessment (30 days):   [x] Falls Risk assessed and no intervention required.   [] Falls risk assessed (via clinical reasoning) and patient requires intervention due to being higher risk for falls  [] Tug Score (>12 s at risk):  [] Falls education provided, including         ASSESSMENT:    Pt is a 32year old male, referred to outpatient occupational therapy with diagnosis of spinal cord injury. Pt presents with impaired general strength and fine motor skills for returning to work. Pt will benefit from outpatient OT to maximize safety and independence with daily living tasks. Recommend outpatient OT 2 x/week for 4 weeks.       Functional Impairments and Activity Limitations (from functional questionnaire, intake, and interview)  []Reduced participation in functional mobility   []Reduced cognition   [x]Reduced participation in Instrumental ADLs (home/community/work/leisure tasks)   []Reduced participation Basic ADLs    [x]Reduced endurance  [x]Reduced fine motor control   []Reduced ROM   [x]Reduced sensation   [x]Reduced coordination  [x]Reduced strength   []Reduced balance   [x]Reduced posture   []Reduced safety awareness   []Reduced functional vision    Participation Restrictions: cervical pain     Classification :    [x]Neurological   []Orthopedic - upper extremity   []General Medical    []Oncology   []Spine   []Lymphedema   []Orthopedic - other   []     Prognosis/Rehab Potential:     []Excellent   [x]Good    []Fair   []Poor    Tolerance of evaluation/treatment:    []Excellent   [x]Good    []Fair   []Poor     Occupational Therapy Evaluation Complexity Justification   [x] A Occupational Profile/Medical and Therapy History  [] no personal factors and/or comorbidities that impact the plan of care; brief history relating to presenting problem  [x]1-2 personal factors and/or comorbidities that impact the plan of care; additional review of physical, cognitive, or psychosocial performance  []3 personal factors and/or comorbidities that impact the plan of care; extensive review of physical cognitive, psychosocial performance  [x] Patient Assessment:  [] a total of 1-3 performance deficits relating to physical, cognitive, psychosocial limitations/restrictions  [x] a total of 3-5 performance deficits relating to physical, cognitive, psychosocial limitations/restrictions  [] a total of 5 or more performance deficits relating to physical, cognitive, psychosocial limitations/restrictions  [x] A clinical presentation with:  [] low complexity, limited amount of treatment options no assessment modification, no co-morbidities  [x] moderate analytical complexity, detailed assessments, minimal to moderate modification of assessments, may have co-morbidities  [] high analytic complexity, comprehensive assessments, multiple treatment options, significant modifications of assessment, co-morbidities affecting performance  [x] Clinical decision making of [] low, [x] moderate, [] high complexity using standardized patient assessment instrument and/or measurable assessment of functional outcome. [] EVAL (LOW) 05681 (typically 15 minutes face-to-face)  [x] EVAL (MOD) 74993 (typically 30 minutes face-to-face)  [] EVAL (HIGH) 67634 (typically 45 minutes face-to-face)  [] RE-EVAL 45714      PLAN    Frequency/Duration:  2 days per week for 4 Weeks:    Interventions:  [x] Therapeutic Exercise: strength training, ROM, endurance training, functional mobility training  [x] Neuromuscular re-education: facilitation of normal movement patterns, visual-perceptual and cognitive training to restore limitations caused by neurological insult. [x] Patient/caregiver education and training regarding Basic Activities of Daily Living  [x] Patient/caregiver education regarding Instrumental Activities of Daily Living, including: home management  [x] Therapeutic Activity: functional task participation that incorporates multiple parameters and treatment interventions.   [x]Community re-entry, Return to work, Return to driving  [x] Cognitive re-orientation and training  [x] Patient/caregiver education on joint protection, postural re-education, activity modification, progression of HEP. [x] Manual therapy including PROM, soft tissue mobilization, manual lymph drainage, and joint manipulations  [x] Adaptive Equipment Education and Training  [] Splinting including custom or pre-fabricated  [] Wheelchair Mobility and/or Seating and positioning  [x] Modalities as needed that may include:   [x] E-stim   [x] Ultrasound  [] Fluidotherapy  [] Iontophoresis  [] Paraffin  [x] Hot Packs/Cold Packs  [x] Dry Needling  [x] Kinesotape or Leukotape      GOALS: Goals established 10/3/22   Patient stated goal: \"improve rotator cuffs, forearms, and hand muscles\". Therapist goals for Patient  Short Term Goals:  to be met in 2 weeks (by 10/17/2022)  Pt will report consistent compliance with HEP at least 4x    Long Term Goals:  to be met in 4 weeks (by 10/31/2022)    Pt will increase bilaterally  strength to 60lbs to improve performance with daily living tasks that require power grasp. :     Pt will Increase bilateral pinch to 25 lbs to improve performance with fine motor tasks such as manipulating screws and bolts. Thank you for the referral of this patient. Please sign the attached certification form.     Time in: 10:30 Time Out:   11:15    Timed Code Treatment Minutes:   15 min eval + 30 minute treatment =  45 minutes   Total Treatment Time:  45 minutes    Medicare Cap total YTD:        Electronically signed by:   SHAREE Akers/REMINGTON #[id number]

## 2022-10-03 NOTE — FLOWSHEET NOTE
Physical Therapy Daily Treatment Note    [x]Daily Treatment Note    []Progress Note    [] Discharge Summary         Date:  10/4/2022    Patient Name:  Jaxon Liu    :  1990  MRN: [id number]      Medical/Treatment Diagnosis Information:  Diagnosis: S12.190A, N31.9, S12.490A, S12.390A, S14.152A, S14.153A, S14.154A, S14.151A  Treatment Diagnosis: review of HEP    Insurance/Certification information:  PT Insurance Information: [facility] approved 1 visit until 10/3/22    Physician Information:  Referring Provider (secondary): Dr. Duane Abu [facility] signed :  []  Yes  [x] No  []  Cosign [x]  Fax    Date of Patient follow up with Physician: unknown    Is this a Progress Report:     []  Yes  [x]  No      If Yes:  Date Range for reporting period: NA      Visit # Insurance Allowable Auth Required   1/   visit until 10/3/22 [x]  Yes []  No        Functional Scale/Score:  Measure Used:     NDI  Score:      5/50            Date Assessed:  10/3/22           Latex Allergy:  [x]NO      []YES  Preferred Language for Healthcare:   [x]English       []other:    RESTRICTIONS/PRECAUTIONS:  SCI, neck surgery with fusion C3-7     SUBJECTIVE:  See eval    Pain level: At eval:      intermittent pressure lower cervical and into traps  Patient reports pain is  1/10 pain at present and  3/10 pain at its worst.    Plan Moving Forward/ For next visit:  NA             OBJECTIVE: See eval        Exercises/Interventions:     Exercises in bold performed in department today. Items not bolded are carried forward from prior visits for continuity of the record.   Exercise/Equipment hold sets reps resistance Comments/cues HEP              THERAPEUTIC EXERCISE      []     SLR supine      [x]   Bridges with tband for glut med activation      [x]   Standing heel raises      [x]   Standing hip abduction B      Can step to R if unable to do kick  [x]         []         []           []           []             []   NEUROMUSCULAR RE-ED      []           []           []           []           []           []           []           []     Therapeutic Exercise/Home Exercise Program:   25 minutes [id number]  HEP has been established, See above, pt given handout(s) of new exercises. Pt inst in role of PT, prognosis, plan of care, activity modification, and benefits of therapy. Therapeutic Activity:  0 minutes     Gait: 0 minutes    Neuromuscular Re-Education:  0 minutes      Canalith Repositioning Procedure:  0 minutes     Manual Therapy:  0 minutes    Modalities: 0 minutes          ASSESSMENT:  See eval     Goals: NA pt only seen for 1 visit     Overall Progression Towards Functional goals/ Treatment Progress Update:  [] Patient is progressing as expected towards functional goals listed. [] Progression is slowed due to complexities/Impairments listed. [] Progression has been slowed due to co-morbidities.   [] Plan just implemented, too soon to assess goals progression <30days   [] Goals require adjustment due to lack of progress  [] Patient is not progressing as expected and requires additional follow up with physician  [x] Patient has met goals as marked above   [] Other    Prognosis for POC: [x] Good [] Fair  [] Poor    Patient requires continued skilled intervention: [] Yes  [x] No    Treatment/Activity Tolerance:  [x] Patient able to complete treatment  [] Patient limited by fatigue  [] Patient limited by pain    [] Patient limited by other medical complications  [] Other:         PLAN: See eval  [] Continue per plan of care [] Alter current plan (see comments above)  [x] Plan of care initiated [] Hold pending MD visit [x] Discharge to [facility]         Therapeutic Exercise and NMR EXR  [x] (14346) Provided verbal/tactile cueing for activities related to strengthening, flexibility, endurance, ROM  for improvements in proximal strength and core control with self care, mobility, lifting and ambulation.  [] (03505) Provided verbal/tactile cueing for activities related to improving balance, coordination, kinesthetic sense, posture, motor skill, proprioception  to assist with core control in self care, mobility, lifting, and ambulation. Therapeutic Activities and Gait:    [] (88189 or 27264) Provided verbal/tactile cueing for activities related to improving balance, coordination, kinesthetic sense, posture, motor skill, proprioception and motor activation to allow for proper function  with self care and ADLs  [] (01013) Provided training and instruction to the patient for proper core and proximal hip recruitment and positioning with ambulation re-education     Home Exercise Program:    [x] (35388) Reviewed/Progressed HEP activities related to strengthening, flexibility, endurance, ROM of core, proximal hip and LE for functional self-care, mobility, lifting and ambulation   [] (46618) Reviewed/Progressed HEP activities related to improving balance, coordination, kinesthetic sense, posture, motor skill, proprioception of core, proximal hip and LE for self care, mobility, lifting, and ambulation      Manual Treatments:  PROM / STM / Oscillations-Mobs:  G-I, II, III, IV (PA's, Inf., Post.)  [] (35621) Provided manual therapy to mobilize soft tissue/joints for the purpose of modulating pain, promoting relaxation,  increasing ROM, reducing/eliminating soft tissue swelling/inflammation/restriction, improving soft tissue extensibility and allowing for proper ROM for normal function with self care, mobility, lifting and ambulation.      CRP:  [] (95259) Canalith Repositioning procedure for the assessment, treatment and education of BPPV    Modalities:   [] Ultrasound   [] Estim    [] Mechanical traction    [] Vaso-pneumatic device   [] Ionto     Charges:  Timed Code Treatment Minutes: 25   Total Treatment Minutes: 45     Medicare Cap total YTD:      [x]N/A      [] $   Workers Comp Time Stamp      (Per CPT and Total Treatment)    Time In: 531   Time Out: 1035       [x] EVAL 950-1010    [] Dry Needling  [x] MO(19611)   x  2   [] EStim Unattended 78526  [] NMR (19241)  x     [] Estim Attended  92180  [] Manual (96788)  x      [] Mechanical Txn 11115  [] TA    x     [] Ultrasound  [] Gait   x                    [] Vaso  [] CRP    [] Ionto           [] Other:        Electronically signed by: Thuy Thoams PT , OMT-C, [id number]        Note: If patient does not return for scheduled/ recommended follow up visits, this note will serve as a discharge from care along with most recent update on progress.

## 2022-10-19 ENCOUNTER — HOSPITAL ENCOUNTER (OUTPATIENT)
Dept: OCCUPATIONAL THERAPY | Age: 32
Setting detail: THERAPIES SERIES
Discharge: HOME | End: 2022-10-19
Payer: COMMERCIAL

## 2022-10-19 PROCEDURE — 97530 THERAPEUTIC ACTIVITIES: CPT

## 2022-10-19 PROCEDURE — 97110 THERAPEUTIC EXERCISES: CPT

## 2022-10-19 PROCEDURE — 97112 NEUROMUSCULAR REEDUCATION: CPT

## 2022-10-19 NOTE — FLOWSHEET NOTE
[facility] and Therapy  [address], [place], [facility]  Office: [phone]   Fax: [phone]    Occupational Therapy Daily Treatment Note    Date:  10/19/2022    Patient Name: Lulú Loera      YOB: 1990        Medical Diagnosis: 514.152 Lesion at C2, 514.153A Lesion at C3, 514.154A Lesion at C4, 514.151A Lesion at C1     Treatment Diagnosis: Weakness in B UEs and impaired fine motor skills                 Referring Physician: Dr. Jerson Jackson                                                                Onset Date:  September 2021       Timed Code Treatment Minutes:  55 minutes    Total Treatment Time:  55 minutes    Visits Allowed/Insurance/Certification Information:  [facility] approved for 16 visits        Precautions/ Contra-indications:  SCI, neck surgery with fusion C3-7     Plan of Care signed: Yes, 10/13/2022    Progress Note: []  Yes  [x]  No  Next due by:  10/31/22    Visit #: 2/8    Pain Level:    3-4/10 - neck and above right shoulder blade. SUBJECTIVE:  Reports compliance with putty. Reports wrist exercises by working on cars. Plan for Next Session  Assess tone/spasticity with Modified Radha scale  F/u on rotator cuff ex  [facility] and hand wrist strengthening  Distraction at right ring finger MP      OBJECTIVE:   Strength  Muscle  (*denotes pain) Right     Left   Comments       DATE 10/3/2022     10/3/2022       Shoulder Flexion  5     5       Shoulder Abduction  4+     5       Elbow Flexion 4+     5       Elbow Extension 5     5       Pronation 4-     4-       Supination 4-     4-       Wrist Flexion 4-     4       Wrist Extension 4-     4       Comments:      Strength (Dynamometry) and Pinch Strength            In lbs.  Right   Left   Date 10/3/2022       10/3/2022        - Position Two 48,48, 53       45, 44, 45                         Lateral Pinch 16, 17, 17       20, 22, 20       Three Point PInch 12, 12, 13 16, 16, 14                         Coordination                  9 Hole Peg Test 34       29        MRMT                 Comments:  Sensation better in left than right. - - - - - - - - - - - -  - - - -       Exercises:    Exercises in bold performed in department today. Items not bolded are carried forward from prior visits for continuity of the record. Therapeutic Exercise/Neuromuscular Re-Education:   Exercise/Equipment  Resistance/Repetitions   Other comments   Theraputty exercises:  , pinch, spreading out putty x10 Pink  Reviewed for HEP   Wrist exercises with 4Ib weights x10 Reviewed for HEP   Rotator cuff  Seated at cables     RUE  IR, 15x at 5#, 15x at 10#  ER, 7x at 5#    LUE  IR, 15x at 5#, 15x at 10#  ER, 15x at 5#, 15x at 10#   Added to HEP   In-hand manipulation/FMC Tissue crumble 10x each hand    Tear, crumble and flick  Tear paper towel into strips  Crumble each strip into balls  Flick balls, thumb and each digit   Added to HEP          ADL/Therapeutic Activities/Neuromuscular Re-Education:     Exercise/Equipment  Resistance/Repetitions   Other comments   Nuts and bolts x5    Thoracic ext Supine on towel roll, 7 min   Added to HEP   To be completed daily for at least  min            Manual Treatments:     Exercise/Equipment  Resistance/Repetitions   Other comments                    Modalities:     Mode/Equipment  Resistance/Repetitions   Other comments                    Home Exercise Program:  provided HEP       ASSESSMENT:   Pt tolerating UE strengthening with focus on bilateral rotator cuff and hands. Educated on importance of posture during strength training. Pt verbalized understanding. Cont per OT poc.        Treatment/Activity Tolerance:   [x] Patient tolerated treatment well [] Patient limited by fatique  [] Patient limited by pain [] Patient limited by other medical complications  [] Other:     GOALS: Goals established 10/3/22   Patient stated goal: \"improve rotator cuffs, forearms, and hand muscles\". Therapist goals for Patient  Short Term Goals:  to be met in 2 weeks (by 10/17/2022)  Pt will report consistent compliance with HEP at least 4x     Long Term Goals:  to be met in 4 weeks (by 10/31/2022)     Pt will increase bilaterally  strength to 60lbs to improve performance with daily living tasks that require power grasp. Pt will Increase bilateral pinch to 25 lbs to improve performance with fine motor tasks such as manipulating screws and bolts. Patient Requires Follow-up:  [x]  Yes  []  No    Plan: [x] Continue per plan of care 2/week for 4 weeks [] Alter current plan (see comments)   [] Plan of care initiated [] Hold pending MD visit [] Discharge     Plan for Next Session:  Progress plan of care as tolerated, work toward occupational goals for improving independence with daily and meaningful activities    Interactive communication between patient's primary therapist and covering therapist was performed in order to ensure accurate information regarding the patient's current condition, treatment and planned interventions as well as recent or anticipated changes in the plan of care.        Electronically signed by:    Demetrice TOLLIVER/REMINGTON PORTILLO#[id number]

## 2022-10-25 ENCOUNTER — HOSPITAL ENCOUNTER (OUTPATIENT)
Dept: OCCUPATIONAL THERAPY | Age: 32
Setting detail: THERAPIES SERIES
Discharge: HOME | End: 2022-10-25
Payer: COMMERCIAL

## 2022-10-25 PROCEDURE — 97530 THERAPEUTIC ACTIVITIES: CPT

## 2022-10-25 NOTE — FLOWSHEET NOTE
[facility] Therapy  [address], [place], [facility]  Office: [phone]   Fax: [phone]    Occupational Therapy Daily Treatment Note    Date:  10/25/2022    Patient Name: Alex Downs      YOB: 1990        Medical Diagnosis: 514.152 Lesion at C2, 514.153A Lesion at C3, 514.154A Lesion at C4, 514.151A Lesion at C1     Treatment Diagnosis: Weakness in B UEs and impaired fine motor skills                 Referring Physician: Dr. Noa Yuan                                                                Onset Date:  September 2021       Timed Code Treatment Minutes: 43  minutes    Total Treatment Time:  43 minutes    Visits Allowed/Insurance/Certification Information:  [facility] approved for 16 visits        Precautions/ Contra-indications:  SCI, neck surgery with fusion C3-7     Plan of Care signed: Yes, 10/13/2022    Progress Note: []  Yes  [x]  No  Next due by:  10/31/22    Visit #: 3/8    Pain Level:   Reports soreness in back of R shoulder blade today, not really pain    SUBJECTIVE:  Pt reports he plans to add the rotator cuff exercises to his HEP today at the gym. Plan for Next Session  Assess tone/spasticity with Modified Radha scale  F/u on rotator cuff ex  [address] [place] and hand wrist strengthening  Distraction at right ring finger MP      OBJECTIVE:   Strength  Muscle  (*denotes pain) Right     Left   Comments       DATE 10/3/2022     10/3/2022       Shoulder Flexion  5     5       Shoulder Abduction  4+     5       Elbow Flexion 4+     5       Elbow Extension 5     5       Pronation 4-     4-       Supination 4-     4-       Wrist Flexion 4-     4       Wrist Extension 4-     4       Comments:      Strength (Dynamometry) and Pinch Strength            In lbs.  Right   Left   Date 10/3/2022       10/3/2022        - Position Two 48,48, 53       45, 44, 45                         Lateral Pinch 16, 17, 17       20, 22, 20       Three Point PInch 12, 12, 13       16, 16, 14                         Coordination                  9 Hole Peg Test 34       29        MRMT                 Comments:  Sensation better in left than right. Exercises:    Exercises in bold performed in department today. Items not bolded are carried forward from prior visits for continuity of the record. Therapeutic Exercise/Neuromuscular Re-Education:   Exercise/Equipment  Resistance/Repetitions   Other comments   Theraputty exercises:  , pinch, spreading out putty x10 Pink  Reviewed for HEP   Wrist exercises with 4Ib weights x10 Reviewed for HEP   Rotator cuff  Reviewed these exercises with pt-he plans on adding these to his workout routine this week. Seated at cables  RUE  IR, 15x at 5#, 15x at 10#  ER, 7x at 5#    LUE  IR, 15x at 5#, 15x at 10#  ER, 15x at 5#, 15x at 10#   Added to HEP   In-hand manipulation/FMC Tissue crumble 10x each hand    Tear, crumble and flick  Tear paper towel into strips  Crumble each strip into balls  Flick balls, thumb and each digit   Added to HEP          ADL/Therapeutic Activities/Neuromuscular Re-Education:     Exercise/Equipment  Resistance/Repetitions   Other comments   Nuts and bolts x5    Thoracic ext Reviewed-tolerates 5 mins. At home daily  Supine on towel roll, 7 min   Added to HEP   To be completed daily for at least  min     Clothespins Pt placed clothespins of various colors and resistance from 1-8# onto vertical bars with his both his R and L hands using three-point pinch. Pinch/ strength Pt used a tea diffuser to grasp various size fabric balls and placed each in a plastic container to improve both  and pinch strength using each hand.       Manual Treatments:     Exercise/Equipment  Resistance/Repetitions   Other comments                    Modalities:     Mode/Equipment  Resistance/Repetitions   Other comments                    Home Exercise Program:  provided HEP       ASSESSMENT:   Pt tolerating UE strengthening with focus on hands today. Reviewed HEP given last week. Pt verbalized understanding. Cont per OT poc. Treatment/Activity Tolerance:   [x] Patient tolerated treatment well [] Patient limited by fatique  [] Patient limited by pain [] Patient limited by other medical complications  [] Other:     GOALS: Goals established 10/3/22   Patient stated goal: \"improve rotator cuffs, forearms, and hand muscles\". Therapist goals for Patient  Short Term Goals:  to be met in 2 weeks (by 10/17/2022)  Pt will report consistent compliance with HEP at least 4x     Long Term Goals:  to be met in 4 weeks (by 10/31/2022)     Pt will increase bilaterally  strength to 60lbs to improve performance with daily living tasks that require power grasp. Pt will Increase bilateral pinch to 25 lbs to improve performance with fine motor tasks such as manipulating screws and bolts. Patient Requires Follow-up:  [x]  Yes  []  No    Plan: [x] Continue per plan of care 2x/week for 4 weeks [] Alter current plan (see comments)   [] Plan of care initiated [] Hold pending MD visit [] Discharge     Plan for Next Session:  Progress plan of care as tolerated, work toward occupational goals for improving independence with daily and meaningful activities    Interactive communication between patient's primary therapist and covering therapist was performed in order to ensure accurate information regarding the patient's current condition, treatment and planned interventions as well as recent or anticipated changes in the plan of care.        Electronically signed by:    Tej Sanches [address]ny 87, OTR/L  #[id number]

## 2022-10-27 ENCOUNTER — HOSPITAL ENCOUNTER (OUTPATIENT)
Dept: PHYSICAL THERAPY | Age: 32
Setting detail: THERAPIES SERIES
Discharge: HOME | End: 2022-10-27
Payer: COMMERCIAL

## 2022-10-27 ENCOUNTER — HOSPITAL ENCOUNTER (OUTPATIENT)
Dept: OCCUPATIONAL THERAPY | Age: 32
Setting detail: THERAPIES SERIES
Discharge: HOME | End: 2022-10-27
Payer: COMMERCIAL

## 2022-10-27 PROCEDURE — 97164 PT RE-EVAL EST PLAN CARE: CPT

## 2022-10-27 PROCEDURE — 97110 THERAPEUTIC EXERCISES: CPT

## 2022-10-27 PROCEDURE — 97112 NEUROMUSCULAR REEDUCATION: CPT

## 2022-10-27 NOTE — FLOWSHEET NOTE
[address] [place] [address] and Therapy  [address] [place] [address] Rd, [place], [facility]  Office: [phone]   Fax: [phone]    Occupational Therapy Daily Treatment Note    Date:  10/27/2022    Patient Name: Josh Tomlinson      YOB: 1990        Medical Diagnosis: 514.152 Lesion at C2, 514.153A Lesion at C3, 514.154A Lesion at C4, 514.151A Lesion at C1     Treatment Diagnosis: Weakness in B UEs and impaired fine motor skills                 Referring Physician: Dr. Becky Seth                                                                Onset Date:  September 2021       Timed Code Treatment Minutes: 30 minutes    Total Treatment Time:  30 minutes    Visits Allowed/Insurance/Certification Information:  [facility] approved for 16 visits        Precautions/ Contra-indications:  SCI, neck surgery with fusion C3-7     Plan of Care signed: Yes, 10/13/2022    Progress Note: []  Yes  [x]  No  Next due by:  10/31/22    Visit #: 4/8    Pain Level:   None reported today    SUBJECTIVE:  Pt reports he tolerated the rotator cuff exercises at the gym earlier this week. Reports his kid's bus did not show up so he had to take them to school today. That is why he was running late . Plan for Next Session  [address] [place] and hand wrist strengthening  Distraction at right ring finger MP      OBJECTIVE:   Strength  Muscle  (*denotes pain) Right     Left   Comments       DATE 10/3/2022     10/3/2022       Shoulder Flexion  5     5       Shoulder Abduction  4+     5       Elbow Flexion 4+     5       Elbow Extension 5     5       Pronation 4-     4-       Supination 4-     4-       Wrist Flexion 4-     4       Wrist Extension 4-     4       Comments:      Strength (Dynamometry) and Pinch Strength            In lbs.  Right   Left   Date 10/3/2022       10/3/2022        - Position Two 48,48, 53       45, 44, 45                         Lateral Pinch 16, 17, 17       20, 22, 20       Three Point PInch 12, 12, 13       16, 16, 14                         Coordination                  9 Hole Peg Test 34       29        MRMT                 Comments:  Sensation better in left than right. Assessment of UE tone/spasticity:    Date: 10/27/22  R/L      Shoulder flexors 0/0      Internal rotators 0/0      External rotators 1+/0      Elbow flexors 0/0      Elbow extensor 1/0      Supinators 1/0      Pronators 0/1      Wrist flexors 0/0      Wrist extensors 0/0      Digit flexors 0/0      Digit extensors 1/0        Modified Radha Scale  0    No increase in muscle tone  1    Slight increase in muscle tone, manifested by a catch and release or by minimal        resistance at the end of the range of motion when the affected part(s) is moved in        flexion or extension  1+ Slight increase in muscle tone, manifested by a catch, followed by minimal        resistance throughout the remainder (less than half) of the ROM  2    More marked increase in muscle tone through most of the ROM, but        affected part(s) easily moved  3    Considerable increase in muscle tone, passive movement difficult  4    Affected part(s) rigid in flexion or extension       Exercises:    Exercises in bold performed in department today. Items not bolded are carried forward from prior visits for continuity of the record. Therapeutic Exercise/Neuromuscular Re-Education:   Exercise/Equipment  Resistance/Repetitions   Other comments   Theraputty exercises:  , pinch, spreading out putty x10 Pink  Reviewed for HEP   Wrist exercises with 5Ib weights Rx10, L x10 Reviewed for HEP   Forearm  Supination/pronation with 5lb wt. Instructed re: stretching R forearm in supination-holding for 10 secs. Added to HEP   Rotator cuff  Discussed strengthening rotators, wrist and forearms several times each week instead of once at the gym. Pt was given green theraband and was Instructed re: IR/ER exercises using the doorway frame in standing and in seated. Performed in standing IR and ER 10x each with each arm. Reviewed these exercises with pt-he plans on adding these to his workout routine this week. Seated at cables  RUE  IR, 15x at 5#, 15x at 10#  ER, 7x at 5#    LUE  IR, 15x at 5#, 15x at 10#  ER, 15x at 5#, 15x at 10#   Added to HEP   In-hand manipulation/FMC Tissue crumble 10x each hand    Tear, crumble and flick  Tear paper towel into strips  Crumble each strip into balls  Flick balls, thumb and each digit   Added to HEP          ADL/Therapeutic Activities/Neuromuscular Re-Education:     Exercise/Equipment  Resistance/Repetitions   Other comments   Nuts and bolts x5    Thoracic ext Reviewed-tolerates 5 mins. At home daily  Supine on towel roll, 7 min   Added to HEP   To be completed daily for at least  min     Clothespins Pt placed clothespins of various colors and resistance from 1-8# onto vertical bars with his both his R and L hands using three-point pinch. Pinch/ strength Pt used a tea diffuser to grasp various size fabric balls and placed each in a plastic container to improve both  and pinch strength using each hand. Manual Treatments:     Exercise/Equipment  Resistance/Repetitions   Other comments                    Modalities:     Mode/Equipment  Resistance/Repetitions   Other comments                    Home Exercise Program:  provided HEP       ASSESSMENT:   Assessed pt's spasticity today in supine. Recommended UE there ex to be performed 3x/wk vs. Once weekly at the gym. Pt was receptive. Reviewed and updated HEP given last week. Pt verbalized understanding. Cont per OT poc. Treatment/Activity Tolerance:   [x] Patient tolerated treatment well [] Patient limited by fatique  [] Patient limited by pain [] Patient limited by other medical complications  [] Other:     GOALS: Goals established 10/3/22   Patient stated goal: \"improve rotator cuffs, forearms, and hand muscles\".          Therapist goals for Patient  Short Term

## 2022-10-31 ENCOUNTER — HOSPITAL ENCOUNTER (OUTPATIENT)
Dept: PHYSICAL THERAPY | Age: 32
Setting detail: THERAPIES SERIES
Discharge: HOME | End: 2022-10-31
Payer: COMMERCIAL

## 2022-10-31 ENCOUNTER — HOSPITAL ENCOUNTER (OUTPATIENT)
Dept: OCCUPATIONAL THERAPY | Age: 32
Setting detail: THERAPIES SERIES
Discharge: HOME | End: 2022-10-31
Payer: COMMERCIAL

## 2022-10-31 PROCEDURE — 97112 NEUROMUSCULAR REEDUCATION: CPT

## 2022-10-31 PROCEDURE — 97110 THERAPEUTIC EXERCISES: CPT

## 2022-10-31 PROCEDURE — 97116 GAIT TRAINING THERAPY: CPT

## 2022-10-31 PROCEDURE — 97530 THERAPEUTIC ACTIVITIES: CPT

## 2022-10-31 NOTE — PROGRESS NOTES
[facility]  [address], [place], [facility]  Office: [phone]   Fax: [phone]  Dear Dr. Angelo Barragan     The following patient has been assessed for therapy services. Please review the attached summary of the patient's plan of care, and verify that you agree with plan for additional therapy services at this time. Plan of Care/Treatment to date:  [x] Therapeutic Exercise                     []  Modalities:  [x] Therapeutic Activity                                   [] Ultrasound  [] Electrical Stimulation           [] Total Motion Release                                [] Fluidotherapy         [] Kinesiotaping  [x]  Neuromuscular Re-education                  [] Iontophoresis         [] Coldpack/hotpack    [x]  Instruction in HEP                                     Other:  [x]  Manual Therapy                                                    []   Dry needling             [x] ADL/Self Care  [] IADL Training  [] LSVT BIG  [] Saebo  [] Splinting  [] Wheelchair Mobility                                                                               Frequency/Duration:  # Days per week:       [] 1 day          # Weeks:        [] 1 week        [] 5 weeks                                            [x] 2 days                                [] 2 weeks      [] 6 weeks                                      [] 3 days                                [] 3 weeks      [] 7 weeks                                      [] 4 days                                [x] 4 weeks      [] 8 weeks                                      [] 5 days                                [] 10 weeks    [] 12 weeks     Rehab Potential:        [] Excellent     [x] Good          [] Fair             [] Poor                 Thank you for the referral of this patient. Please sign.      Physician signature_______________________ Date________________  By signing above, therapists plan is approved by physician          Electronically signed by: Rehana Koroma OT, OTR/L        Occupational Therapy Daily Treatment Note    Date:  10/31/2022    Patient Name: Ines Guajardo      YOB: 1990        Medical Diagnosis: 514.152 Lesion at C2, 514.153A Lesion at C3, 514.154A Lesion at C4, 514.151A Lesion at C1     Treatment Diagnosis: Weakness in B UEs and impaired fine motor skills                 Referring Physician: Dr. Sanches August                                                                Onset Date:  September 2021       Visits Allowed/Insurance/Certification Information:  [facility] approved for 16 visits      Precautions/ Contra-indications:  SCI, neck surgery with fusion C3-7     Plan of Care signed: Yes, 10/13/2022    Progress Note: [x]  Yes  []  No  Next due by:  11/28/22    Visit #: 5/8    Pain Level:   2/10 arms and neck    SUBJECTIVE:  Pt reports compliance with most OT HEP except tissue crumble and [facility]. Pt noting forearm strength. Plan for Next Session  [facility] and hand wrist strengthening        OBJECTIVE:   Strength  Muscle  (*denotes pain) Right     Left          DATE 10/3/2022 10/31    10/3/2022  10/31     Shoulder Flexion  5  5   5 5      Shoulder Abduction  4+  5   5  5     Elbow Flexion 4+  5   5  5     Elbow Extension 5 5    5 5      Pronation 4-  4+   4-  4+     Supination 4-  4+   4-  4+     Wrist Flexion 4-  4+   4  4+     Wrist Extension 4-  4+   4  4+     Comments:      Strength (Dynamometry) and Pinch Strength            In lbs.  Right   Left   Date 10/3/2022  10/31     10/3/2022  10/31      - Position Two 49.6  56     44.6  51.3                       Lateral Pinch 16.6  17     20.6  20.3     Three Point PInch 12.3  13.6     15.3  15.6                       Coordination                  9 Hole Peg Test 34  27     29  25     Comments: 10/31: demo supination during  strength testing  Sensation better in left than right. Assessment of UE tone/spasticity:    Date: 10/27/22  R/L      Shoulder flexors 0/0      Internal rotators 0/0      External rotators 1+/0      Elbow flexors 0/0      Elbow extensor 1/0      Supinators 1/0      Pronators 0/1      Wrist flexors 0/0      Wrist extensors 0/0      Digit flexors 0/0      Digit extensors 1/0        Modified Radha Scale  0    No increase in muscle tone  1    Slight increase in muscle tone, manifested by a catch and release or by minimal        resistance at the end of the range of motion when the affected part(s) is moved in        flexion or extension  1+ Slight increase in muscle tone, manifested by a catch, followed by minimal        resistance throughout the remainder (less than half) of the ROM  2    More marked increase in muscle tone through most of the ROM, but        affected part(s) easily moved  3    Considerable increase in muscle tone, passive movement difficult  4    Affected part(s) rigid in flexion or extension       Exercises:    Exercises in bold performed in department today. Items not bolded are carried forward from prior visits for continuity of the record. Therapeutic Exercise/Neuromuscular Re-Education:   Exercise/Equipment  Resistance/Repetitions   Other comments   Theraputty exercises   , pinch, spreading out putty  x10 Pink  Reviewed for HEP   Wrist exercises with    5Ib weights Rx10, L x10 Reviewed for HEP   Forearm  Supination/pronation with 5lb wt. Instructed re: stretching R forearm in supination-holding for 10 secs. Reviewed for HEP   Rotator cuff  Discussed strengthening rotators, wrist and forearms several times each week instead of once at the gym. Pt was given green theraband and was Instructed re: IR/ER exercises using the doorway frame in standing and in seated. Performed in standing IR and ER 10x each with each arm.       Reviewed these exercises with pt-he plans on adding these to his workout routine this week.    Seated at cables  RUE  IR, 15x at 5#, 15x at 10#  ER, 10x at 5#    LUE  IR, 15x at 5#, 15x at 10#  ER, 15x at 5#, 15x at 10#   Reviewed for HEP   In-hand manipulation/FMC Tissue crumble 10x each hand    Tear, crumble and flick  Tear paper towel into strips  Crumble each strip into balls  Flick balls, thumb and each digit   Reviewed for HEP          ADL/Therapeutic Activities/Neuromuscular Re-Education:     Exercise/Equipment  Resistance/Repetitions   Other comments   Nuts and bolts x5    Thoracic ext Reviewed-tolerates 5 mins. At home daily  Supine on towel roll, 7 min   Added to HEP   To be completed daily for at least  min     Clothespins Pt placed clothespins of various colors and resistance from 1-8# onto vertical bars with his both his R and L hands using three-point pinch. Pinch/ strength Pt used a tea diffuser to grasp various size fabric balls and placed each in a plastic container to improve both  and pinch strength using each hand. Manual Treatments:     Exercise/Equipment  Resistance/Repetitions   Other comments                    Modalities:     Mode/Equipment  Resistance/Repetitions   Other comments                    Home Exercise Program:  provided HEP       ASSESSMENT:   Progress note completed this date. Pt demo progress toward OT goals as he demo improving BUE strength per MMT and /pinch strengths. Pt met 0/1 STG and 0/2 LTG. Pt will cont to benefit from outpatient occupational therapy to address BUE impairments. Cont to recommend UE there ex to be performed 3x/wk vs. Once weekly at the gym. Recommend cont OP OT 2x/wk for 4 weeks. See updated OT poc below. Treatment/Activity Tolerance:   [x] Patient tolerated treatment well [] Patient limited by fatique  [] Patient limited by pain [] Patient limited by other medical complications  [] Other:     GOALS: Goals established 10/3/22   Patient stated goal: \"improve rotator cuffs, forearms, and hand muscles\". Therapist goals for Patient  Short Term Goals:  to be met in 2 weeks (by 11/14/2022)  Pt will report consistent compliance with HEP at least 4x - Progressing, cont goal for consistency, 10/31/2022    Pt will demo MMT for bilateral wrist and froearms to 5/5 for improved performance with daily living tasks. - added 10/31/2022     Long Term Goals:  to be met in 4 weeks (by 11/28/2022)     Pt will increase bilaterally  strength to 60lbs to improve performance with daily living tasks that require power grasp. - Progressing, cont goal, 10/31/2022     Pt will Increase bilateral pinch to 25 lbs to improve performance with fine motor tasks such as manipulating screws and bolts. - Progressing, cont goal, 10/31/2022    Pt will demo improved [facility] for daily living tasks by completing NHPT in 20 seconds or less on each side. - added 10/31/2022    Pt will demo improved shoulder strength as evidence by ability to complete 15 reps of IR/ER at 15#.  - added 10/31/2022      Patient Requires Follow-up:  [x]  Yes  []  No    Plan: [x] Continue per plan of care 2x/week for 4 weeks [] Alter current plan (see comments)   [] Plan of care initiated [] Hold pending MD visit [] Discharge         Timed Code Treatment Minutes: 42 minutes    Total Treatment Time:  42 minutes      Interactive communication between patient's primary therapist and covering therapist was performed in order to ensure accurate information regarding the patient's current condition, treatment and planned interventions as well as recent or anticipated changes in the plan of care.        Electronically signed by:    Reena TOLLIVER/REMINGTON KAUR#[id number]

## 2022-10-31 NOTE — PROGRESS NOTES
Physical Therapy Daily Treatment Note    [x]Daily Treatment Note    []Progress Note/ Re-evaluation    [] Discharge Summary         Date:  10/31/2022    Patient Name:  Khushi Davila    :  1990  MRN: [id number]      Medical/Treatment Diagnosis Information:  Diagnosis: E38.837P, E52.572Q, S14.153A  Treatment Diagnosis: LE weakness, abnormality of gait, imbalance    Insurance/Certification information:  PT Insurance Information: [facility] approved for 2x/week x 8 weeks (16 visits) from 10/3/22 to 22    Physician Information:  Referring Provider (secondary): Dr. Siomara Leal of care sent to provider:      [x]Faxed   []Co-signature    (attempts: 1[x]  10/27/22   2[] 3[])       Plan of care signed :  []  Yes  [x] No  []  Cosign [x]  Fax    Date of Patient follow up with Physician: before end of year, pt will check     Is this a Progress Report:     []  Yes  [x]  No      If Yes:  Date Range for reporting period:  Beginning 10/27/22  Ending     Progress report will be due (10 Rx or 30 days whichever is less):       Recertification will be due (POC Duration  / 90 days whichever is less): 23 or 16th visit         Visit # Insurance Allowable Auth Required   3/17  1 visit until 10/3/22  New C9 approved for 2x/week x 8 weeks from 10/3/22 to 22 [x]  Yes []  No        Functional Scale/Score:  Measure Used:     NDI  Score:      5/50            Date Assessed:  10/3/22           Latex Allergy:  [x]NO      []YES  Preferred Language for Healthcare:   [x]English       []other:    RESTRICTIONS/PRECAUTIONS:  SCI, neck surgery with fusion C3-7     SUBJECTIVE:    Pt states he uses cane 80% of time and without cane maybe 20% of time. No steps at his home but his SO has 1 step to enter and then flight of steps in home to second floor     Patient goal for therapy:   \"to get back to work and get stronger with legs and walking\"      Pain level:  It's not so much pain. It's more pressure.  He feels it more on the right side down into his arm   At eval:      intermittent pressure lower cervical and into traps  Patient reports pain is  1/10 pain at present and  3/10 pain at its worst.    Plan Moving Forward/ For next visit:    Have pt fill out LEFS next visit   Progress trunk strength and stability  Progress LE strength and flexibility  Balance, gait, steps as able             OBJECTIVE:      Movement Left MMT Right MMT   Comments        PROM   Hip Flexion 3-  2+  2  2       Hip Extension  2+  2+ Tested in prone      Hip Abduction 2+  2+ 2+  2   Tested in SL      Hip Adduction 4  3- 4  2+ Tested in prone      Hip Internal Rotation           Hip External Rotation  4 4        Knee Flexion 5  4   4-  3- Tested in prone      Knee Extension 5  5 4  4       Ankle Dorsiflexion 4+  4+ 2+  2+       Ankle Plantarflexion 2+  2 3-  3-   Tested in standing with single leg heel raise       Ankle Inversion 4+  4- 3-  3+         Ankle Eversion 4+  4+ 3-  3+                         Exercises/Interventions:     Exercises in bold performed in department today. Items not bolded are carried forward from prior visits for continuity of the record.   Exercise/Equipment hold sets reps resistance Comments/cues HEP              THERAPEUTIC EXERCISE      []   SLR supine      [x]   Bridges with tband for glut med activation      [x]   Standing heel raises      [x]   Standing hip abduction B      Can step to R if unable to do kick  [x]   Seated ankle dorsiflexion on R LE  2 15 Red Theraband  [x]         []           []           []             []   NEUROMUSCULAR RE-ED      []     Bird Dog  2 15   []     Bridges with legs on [employer]  2 15   []     Hamstring Curls on [address]   2 15   []    Marching on [address] with B UE support   2 15  Pt relying heavily on his arms for support []     LAQ with B UE support on [address]  2 10  Pt relying heavily on his arms for support []           []           []     Therapeutic Exercise/Home Exercise Program:   10 minutes   10:51-11:01  Added seated ankle dorsiflexion to HEP due to weakness noted with gait     HEP has been established, See above, pt to bring in his ex folder and exs previously given last visit    Pt inst in role of PT, prognosis, plan of care, activity modification, and benefits of therapy. Discussed aquatic therapy       Therapeutic Activity:  0 minutes     Gait:  8 minutes: 10:43-10:51    Gait: pt ambulates with SPC, slow nimo, decreased knee extension B, forward posture. He states that his right knee \"locks\" on him from time to time with walking. It did not do it initially after proximal stabilization exercises (Neuromuscular Re-Education) but then happened. Pt with decreased right ankle dorsiflexion with gait due to weakness noted     Steps:  pt able to do 4 steps in dept with one rail and SPC with reciprocal pattern, but with slow nimo, decreased full knee extension on stance leg. Neuromuscular Re-Education:  13 minutes  [id number], then 9 minutes again from 11:01-11:10 (22 minutes total time)  Quadruped arm and leg raises to improve trunk stability  Bridges with legs on [employer] balance activities on [employer] Repositioning Procedure:  0 minutes     Manual Therapy:  0 minutes    Modalities: 0 minutes          ASSESSMENT:  Pt will benefit from skilled PT to improve strength, balance, gait and function. Pt able to progress to higher level balance challenges which challenged trunk stability with use of the Swiss/Exercise ball and quadruped positioning. GOALS: Goals established 10/27/22     Patient stated goal:  \"to get back to work and get stronger with legs and walking\"  [] Progressing: [] Met: [] Not Met: [] Adjusted    Therapist goals for Patient:   Short Term Goals: To be achieved in:  4 weeks   - Independent in HEP and progression per patient tolerance, in order to prevent re-injury.    [] Progressing: [] Met: [] Not Met: [] Adjusted  - Improve strength by 1/3 grade in weak areas to allow for proper functional mobility as indicated by patients Functional Deficits. [] Progressing: [] Met: [] Not Met: [] Adjusted      Long Term Goals: To be achieved in:  8 weeks   - LEFS score 60/80 or better to assist with reaching prior level of function. [] Progressing: [] Met: [] Not Met: [] Adjusted   - Patient will have a decrease in pain to facilitate improvement in movement, function, and ADLs as indicated by Functional Deficits. [] Progressing: [] Met: [] Not Met: [] Adjusted  [x] NOT APPLICABLE    - Patient will demonstrate increased AROM to [facility] to allow for proper joint functioning as indicated by patients Functional Deficits. [] Progressing: [] Met: [] Not Met: [] Adjusted   - Improve strength by 2/3 grade or better in weak areas to allow for proper functional mobility as indicated by patients Functional Deficits. [] Progressing: [] Met: [] Not Met: [] Adjusted   - improve Tinetti score to 22/28 or better   [] Progressing: [] Met: [] Not Met: [] Adjusted   - Stair goal: pt able to do flight of steps with reciprocal pattern and SPC with improved technique   [] Progressing: [] Met: [] Not Met: [] Adjusted       Overall Progression Towards Functional goals/ Treatment Progress Update:  [] Patient is progressing as expected towards functional goals listed. [] Progression is slowed due to complexities/Impairments listed. [] Progression has been slowed due to co-morbidities.   [x] Plan just implemented, too soon to assess goals progression <30days   [] Goals require adjustment due to lack of progress  [] Patient is not progressing as expected and requires additional follow up with physician  [] Patient has met goals as marked above   [] Other    Prognosis for POC: [x] Good [] Fair  [] Poor    Patient requires continued skilled intervention: [x] Yes  [] No    Treatment/Activity Tolerance:  [x] Patient able to complete treatment  [] Patient limited by fatigue  [] Patient limited by pain    [] Patient limited by other medical complications  [] Other:         PLAN:   [] Continue per plan of care [] Alter current plan (see comments above)  [x] Plan of care initiated [] Hold pending MD visit [] Discharge          Therapeutic Exercise and NMR EXR  [x] (30924) Provided verbal/tactile cueing for activities related to strengthening, flexibility, endurance, ROM  for improvements in proximal strength and core control with self care, mobility, lifting and ambulation. [x] (30456) Provided verbal/tactile cueing for activities related to improving balance, coordination, kinesthetic sense, posture, motor skill, proprioception  to assist with core control in self care, mobility, lifting, and ambulation.      Therapeutic Activities and Gait:    [] (81998 or 37328) Provided verbal/tactile cueing for activities related to improving balance, coordination, kinesthetic sense, posture, motor skill, proprioception and motor activation to allow for proper function  with self care and ADLs  [] (08560) Provided training and instruction to the patient for proper core and proximal hip recruitment and positioning with ambulation re-education     Home Exercise Program:    [x] (68609) Reviewed/Progressed HEP activities related to strengthening, flexibility, endurance, ROM of core, proximal hip and LE for functional self-care, mobility, lifting and ambulation   [] (69389) Reviewed/Progressed HEP activities related to improving balance, coordination, kinesthetic sense, posture, motor skill, proprioception of core, proximal hip and LE for self care, mobility, lifting, and ambulation      Manual Treatments:  PROM / STM / Oscillations-Mobs:  G-I, II, III, IV (PA's, Inf., Post.)  [] (27884) Provided manual therapy to mobilize soft tissue/joints for the purpose of modulating pain, promoting relaxation,  increasing ROM, reducing/eliminating soft tissue swelling/inflammation/restriction, improving soft tissue extensibility and allowing for proper ROM for normal function with self care, mobility, lifting and ambulation. CRP:  [] (30371) Canalith Repositioning procedure for the assessment, treatment and education of BPPV    Modalities:   [] Ultrasound   [] Estim    [] Mechanical traction    [] Vaso-pneumatic device   [] Ionto     Charges:  Timed Code Treatment Minutes: 40   Total Treatment Minutes: 40     Medicare Cap total YTD:      [x]N/A      [] $   Workers Comp Time Stamp      (Per CPT and Total Treatment)    Time In: 10:30   Time Out: 11:11       [] EVAL     [] Dry Needling  [x] SY(58688)   x  1   [] EStim Unattended 54295  [x] NMR (82913)  x  1  [] Estim Attended  10192  [] Manual (43732)  x      [] Mechanical Txn 17678  [] TA    x     [] Ultrasound  [x] Gait   x     1             [] Vaso  [] CRP    [] Ionto           [] Other:      Electronically signed by: Mahad Dial PT, DPT #[id number]         Note: If patient does not return for scheduled/ recommended follow up visits, this note will serve as a discharge from care along with most recent update on progress.

## 2022-11-03 ENCOUNTER — HOSPITAL ENCOUNTER (OUTPATIENT)
Dept: OCCUPATIONAL THERAPY | Age: 32
Setting detail: THERAPIES SERIES
Discharge: HOME | End: 2022-11-03
Payer: COMMERCIAL

## 2022-11-03 ENCOUNTER — HOSPITAL ENCOUNTER (OUTPATIENT)
Dept: PHYSICAL THERAPY | Age: 32
Setting detail: THERAPIES SERIES
Discharge: HOME | End: 2022-11-03
Payer: COMMERCIAL

## 2022-11-03 PROCEDURE — 97112 NEUROMUSCULAR REEDUCATION: CPT

## 2022-11-03 PROCEDURE — 97110 THERAPEUTIC EXERCISES: CPT

## 2022-11-03 NOTE — PROGRESS NOTES
[address] [place] [address] and Therapy  [facility] [address], [place], [facility]  Office: [phone]   Fax: [phone]      Patient Name:  Colt Ramos  :  1990   Date:  11/3/2022  Cancels to Date: 1  No-shows to Date: 0    For today's appointment patient:  [x] Cancelled  [] Rescheduled appointment  [] No-show     Reason given by patient:  [] Patient ill  [] Conflicting appointment  [] No transportation    [] Conflict with work  [x] No reason given  [] Other:     Comments:          Electronically signed by:  Veronika Thomas, PT, DPT, OMT-C  #[id number]

## 2022-11-03 NOTE — PROGRESS NOTES
[facility]. and Therapy  [address], [place], [facility]  Office: [phone]   Fax: [phone]      Patient Name:  Froilan Jean-Baptiste  :  1990   Date:  11/3/2022  Cancels to Date: 1  No-shows to Date: 0    For today's appointment patient:  [x] Cancelled  [] Rescheduled appointment  [] No-show     Reason given by patient:  [] Patient ill  [] Conflicting appointment  [] No transportation    [] Conflict with work  [x] No reason given  [] Other:     Comments:          Electronically signed by:  Carly Sanches [address], OTR/L  #[id number]

## 2022-11-03 NOTE — PROGRESS NOTES
Physical Therapy Daily Treatment Note    [x]Daily Treatment Note    []Progress Note/ Re-evaluation    [] Discharge Summary         Date:  11/3/2022    Patient Name:  Colt Ramos    :  1990  MRN: [id number]      Medical/Treatment Diagnosis Information:  Diagnosis: G00.931A, J30.543R, S14.153A  Treatment Diagnosis: LE weakness, abnormality of gait, imbalance    Insurance/Certification information:  PT Insurance Information: [facility] approved for 2x/week x 8 weeks (16 visits) from 10/3/22 to 22    Physician Information:  Referring Provider (secondary): Dr. Mccoy Finders of care sent to provider:      [x]Faxed   []Co-signature    (attempts: 1[x]  10/27/22   2[] 3[])       Plan of care signed :  []  Yes  [x] No  []  Cosign [x]  Fax    Date of Patient follow up with Physician: before end of year, pt will check     Is this a Progress Report:     []  Yes  [x]  No      If Yes:  Date Range for reporting period:  Beginning 10/27/22  Ending     Progress report will be due (10 Rx or 30 days whichever is less): 31      Recertification will be due (POC Duration  / 90 days whichever is less): 23 or th visit         Visit # Insurance Allowable Auth Required     1 visit until 10/3/22  New  approved for 2x/week x 8 weeks from 10/3/22 to 22 [x]  Yes []  No        Functional Scale/Score:  Measure Used:     NDI  Score:      5/50            Date Assessed:  10/3/22      Measure Used:     LEFS  Score:      47/80            Date Assessed:  11/3/22         Latex Allergy:  [x]NO      []YES  Preferred Language for Healthcare:   [x]English       []other:    RESTRICTIONS/PRECAUTIONS:  SCI, neck surgery with fusion C3-7     SUBJECTIVE:    Pt 15 minutes late to PT appt today, reports he overslept since he was he was up late last night putting in new floors. Feels very stiff and weak this morning. Pt states he is trying to do HEP daily but has been busy working on his home.         Patient goal for therapy:   \"to get back to work and get stronger with legs and walking\"      Pain level:  No pain today. At eval:    intermittent pressure lower cervical and into traps  Patient reports pain is  1/10 pain at present and  3/10 pain at its worst.    Plan Moving Forward/ For next visit:    Progress trunk strength and stability  Progress LE strength and flexibility  Balance, gait, steps as able        OBJECTIVE:      Exercises/Interventions:     Exercises in bold performed in department today. Items not bolded are carried forward from prior visits for continuity of the record. Exercise/Equipment hold sets reps resistance Comments/cues HEP              THERAPEUTIC EXERCISE      []   SLR supine  2 10   [x]   Bridges with tband for glut med activation      [x]   Standing heel raises      [x]   Standing hip abduction B      Can step to R if unable to do kick  [x]   Seated ankle dorsiflexion on R LE  2 15 Red Theraband  [x]   Sidelying clamshells  2 15   [x]           []           []             []   NEUROMUSCULAR RE-ED      []     Bird Dog  2 15   []     Bridges with legs on [employer]  2 20   []     Hamstring Curls on [employer]   2 20   []    Marching on [employer] with B UE support   2 15  Pt relying heavily on his arms for support []     LAQ with B UE support on [employer]  2 10  Pt relying heavily on his arms for support []           []           []     Therapeutic Exercise/Home Exercise Program:   12 minutes 10:20 - 10:32    Added seated ankle dorsiflexion to HEP due to weakness noted with gait     HEP has been established, See above, pt to bring in his ex folder and exs previously given last visit    Pt inst in role of PT, prognosis, plan of care, activity modification, and benefits of therapy. Discussed aquatic therapy     HEP reviewed. See above chart, pt given handout(s) of new exercises.          Therapeutic Activity:  0 minutes     Gait:  0 minutes:     Gait: pt ambulates with SPC, slow nimo, decreased knee extension B, forward posture. He states that his right knee \"locks\" on him from time to time with walking. It did not do it initially after proximal stabilization exercises (Neuromuscular Re-Education) but then happened. Pt with decreased right ankle dorsiflexion with gait due to weakness noted     Steps:  pt able to do 4 steps in dept with one rail and SPC with reciprocal pattern, but with slow nimo, decreased full knee extension on stance leg. Neuromuscular Re-Education: 10:05 - 10:20  See chart above. Canalith Repositioning Procedure:  0 minutes     Manual Therapy:  0 minutes    Modalities: 0 minutes        ASSESSMENT:  Pt continues to fatigue easily with strengthening therex, inc reps today. Encouraged pt to pace activity at home as to not \"overdo it. \" Gait training next visit with and without SPC. GOALS: Goals established 10/27/22     Patient stated goal:  \"to get back to work and get stronger with legs and walking\"  [] Progressing: [] Met: [] Not Met: [] Adjusted    Therapist goals for Patient:   Short Term Goals: To be achieved in:  4 weeks   - Independent in HEP and progression per patient tolerance, in order to prevent re-injury. [] Progressing: [] Met: [] Not Met: [] Adjusted  - Improve strength by 1/3 grade in weak areas to allow for proper functional mobility as indicated by patients Functional Deficits. [] Progressing: [] Met: [] Not Met: [] Adjusted      Long Term Goals: To be achieved in:  8 weeks   - LEFS score 60/80 or better to assist with reaching prior level of function. [] Progressing: [] Met: [] Not Met: [] Adjusted   - Patient will have a decrease in pain to facilitate improvement in movement, function, and ADLs as indicated by Functional Deficits.   [] Progressing: [] Met: [] Not Met: [] Adjusted  [x] NOT APPLICABLE    - Patient will demonstrate increased AROM to [facility] to allow for proper joint functioning as indicated by patients Functional Deficits. [] Progressing: [] Met: [] Not Met: [] Adjusted   - Improve strength by 2/3 grade or better in weak areas to allow for proper functional mobility as indicated by patients Functional Deficits. [] Progressing: [] Met: [] Not Met: [] Adjusted   - improve Tinetti score to 22/28 or better   [] Progressing: [] Met: [] Not Met: [] Adjusted   - Stair goal: pt able to do flight of steps with reciprocal pattern and SPC with improved technique   [] Progressing: [] Met: [] Not Met: [] Adjusted       Overall Progression Towards Functional goals/ Treatment Progress Update:  [] Patient is progressing as expected towards functional goals listed. [] Progression is slowed due to complexities/Impairments listed. [] Progression has been slowed due to co-morbidities.   [x] Plan just implemented, too soon to assess goals progression <30days   [] Goals require adjustment due to lack of progress  [] Patient is not progressing as expected and requires additional follow up with physician  [] Patient has met goals as marked above   [] Other    Prognosis for POC: [x] Good [] Fair  [] Poor    Patient requires continued skilled intervention: [x] Yes  [] No    Treatment/Activity Tolerance:  [x] Patient able to complete treatment  [] Patient limited by fatigue  [] Patient limited by pain    [] Patient limited by other medical complications  [] Other:         PLAN:   [] Continue per plan of care [] Alter current plan (see comments above)  [x] Plan of care initiated [] Hold pending MD visit [] Discharge          Therapeutic Exercise and NMR EXR  [x] (98897) Provided verbal/tactile cueing for activities related to strengthening, flexibility, endurance, ROM  for improvements in proximal strength and core control with self care, mobility, lifting and ambulation.  [] (25245) Provided verbal/tactile cueing for activities related to improving balance, coordination, kinesthetic sense, posture, motor skill, proprioception  to assist with core control in self care, mobility, lifting, and ambulation. Therapeutic Activities and Gait:    [] (99085 or 15749) Provided verbal/tactile cueing for activities related to improving balance, coordination, kinesthetic sense, posture, motor skill, proprioception and motor activation to allow for proper function  with self care and ADLs  [] (12636) Provided training and instruction to the patient for proper core and proximal hip recruitment and positioning with ambulation re-education     Home Exercise Program:    [x] (52750) Reviewed/Progressed HEP activities related to strengthening, flexibility, endurance, ROM of core, proximal hip and LE for functional self-care, mobility, lifting and ambulation   [] (11007) Reviewed/Progressed HEP activities related to improving balance, coordination, kinesthetic sense, posture, motor skill, proprioception of core, proximal hip and LE for self care, mobility, lifting, and ambulation      Manual Treatments:  PROM / STM / Oscillations-Mobs:  G-I, II, III, IV (PA's, Inf., Post.)  [] (73286) Provided manual therapy to mobilize soft tissue/joints for the purpose of modulating pain, promoting relaxation,  increasing ROM, reducing/eliminating soft tissue swelling/inflammation/restriction, improving soft tissue extensibility and allowing for proper ROM for normal function with self care, mobility, lifting and ambulation.      CRP:  [] (09749) Canalith Repositioning procedure for the assessment, treatment and education of BPPV    Modalities:   [] Ultrasound   [] Estim    [] Mechanical traction    [] Vaso-pneumatic device   [] Ionto     Charges:  Timed Code Treatment Minutes: 28   Total Treatment Minutes: 28     Medicare Cap total YTD:      [x]N/A      [] $   Workers Comp Time Stamp      (Per CPT and Total Treatment)    Time In: 10:04   Time Out: 10:32       [] EVAL     [] Dry Needling  [x] DG(37380)   x  2   [] EStim Unattended 58435  [] NMR (51876)  x    [] Estim Attended  18587  [] Manual (47687)  x      [] [place]n 46[address]  [] TA    x     [] Ultrasound  [] Gait   x                 [] Vaso  [] CRP    [] Ionto           [] Other:      Electronically signed by: Bladimir Rincon, PT, DPT, OMT-C  #[id number]          Note: If patient does not return for scheduled/ recommended follow up visits, this note will serve as a discharge from care along with most recent update on progress.

## 2022-11-07 ENCOUNTER — HOSPITAL ENCOUNTER (OUTPATIENT)
Dept: OCCUPATIONAL THERAPY | Age: 32
Setting detail: THERAPIES SERIES
Discharge: HOME | End: 2022-11-07
Payer: COMMERCIAL

## 2022-11-07 ENCOUNTER — HOSPITAL ENCOUNTER (OUTPATIENT)
Dept: PHYSICAL THERAPY | Age: 32
Setting detail: THERAPIES SERIES
Discharge: HOME | End: 2022-11-07
Payer: COMMERCIAL

## 2022-11-07 PROCEDURE — 97110 THERAPEUTIC EXERCISES: CPT

## 2022-11-07 PROCEDURE — 97112 NEUROMUSCULAR REEDUCATION: CPT

## 2022-11-07 NOTE — FLOWSHEET NOTE
[facility]  [address], [place], [facility]  Office: [phone]   Fax: [phone]    Occupational Therapy Daily Treatment Note    Date:  11/7/2022    Patient Name: Jessica Chester      YOB: 1990        Medical Diagnosis: 514.152 Lesion at C2, 514.153A Lesion at C3, 514.154A Lesion at C4, 514.151A Lesion at C1     Treatment Diagnosis: Weakness in B UEs and impaired fine motor skills                 Referring Physician: Dr. Nargis Harding                                                                Onset Date:  September 2021       Visits Allowed/Insurance/Certification Information:  [facility] approved for 16 visits      Precautions/ Contra-indications:  SCI, neck surgery with fusion C3-7     Plan of Care signed: Yes, 10/13/2022; 11/2/2022    Progress Note: []  Yes  [x]  No  Next due by:  11/28/22    Visit #: 1/8, 5/8    Pain Level:   2/10 arms and neck    SUBJECTIVE:  Pt arrived to session 10 min late. Pt reports non-compliance with HEP \"the last couple days\" due to working on the floors and being with the kids. Reports installing new floors at home. Plan for Next Session  [address] and hand wrist strengthening        OBJECTIVE:   Strength  Muscle  (*denotes pain) Right     Left          DATE 10/3/2022 10/31    10/3/2022  10/31     Shoulder Flexion  5  5   5 5      Shoulder Abduction  4+  5   5  5     Elbow Flexion 4+  5   5  5     Elbow Extension 5 5    5 5      Pronation 4-  4+   4-  4+     Supination 4-  4+   4-  4+     Wrist Flexion 4-  4+   4  4+     Wrist Extension 4-  4+   4  4+     Comments:      Strength (Dynamometry) and Pinch Strength            In lbs.  Right   Left   Date 10/3/2022  10/31     10/3/2022  10/31      - Position Two 49.6  56     44.6  51.3                       Lateral Pinch 16.6  17     20.6  20.3     Three Point PInch 12.3  13.6     15.3  15.6                       Coordination                  9 Hole Peg Test 34  27     29  25     Comments: 10/31: demo supination during  strength testing  Sensation better in left than right. Assessment of UE tone/spasticity:    Date: 10/27/22  R/L      Shoulder flexors 0/0      Internal rotators 0/0      External rotators 1+/0      Elbow flexors 0/0      Elbow extensor 1/0      Supinators 1/0      Pronators 0/1      Wrist flexors 0/0      Wrist extensors 0/0      Digit flexors 0/0      Digit extensors 1/0        Modified Radha Scale  0    No increase in muscle tone  1    Slight increase in muscle tone, manifested by a catch and release or by minimal        resistance at the end of the range of motion when the affected part(s) is moved in        flexion or extension  1+ Slight increase in muscle tone, manifested by a catch, followed by minimal        resistance throughout the remainder (less than half) of the ROM  2    More marked increase in muscle tone through most of the ROM, but        affected part(s) easily moved  3    Considerable increase in muscle tone, passive movement difficult  4    Affected part(s) rigid in flexion or extension       Exercises:    Exercises in bold performed in department today. Items not bolded are carried forward from prior visits for continuity of the record. Therapeutic Exercise/Neuromuscular Re-Education:   Exercise/Equipment  Resistance/Repetitions   Other comments   Theraputty exercises   , pinch, spreading out putty  x10 Pink  Reviewed for HEP   Wrist strengthening 5Ib hand weights  Rx10, L x10    Seated at cables using 5#  Wrist ext, 15x each side  Wrist flex, 15x each side    FlexBar - Green  Frowns, 15x  Smiles, 15x  Twists, 15x (both directions) Reviewed for HEP   Forearm  Supination/pronation with 5lb wt. Instructed re: stretching R forearm in supination-holding for 10 secs. Reviewed for HEP   Rotator cuff  Discussed strengthening rotators, wrist and forearms several times each week instead of once at the gym.   Pt was given green theraband and was Instructed re: IR/ER exercises using the doorway frame in standing and in seated. Performed in standing IR and ER 10x each with each arm. Reviewed these exercises with pt-he plans on adding these to his workout routine this week. Seated at cables  RUE  IR, 15x at 5#, 15x at 10#  ER, 10x at 5#    LUE  IR, 15x at 5#, 15x at 10#  ER, 15x at 5#, 10x at 10#   Reviewed for HEP   In-hand manipulation/FMC Tissue crumble 10x each hand    Tear, crumble and flick  Tear paper towel into strips  Crumble each strip into balls  Flick balls, thumb and each digit   Reviewed for HEP    strengthening 55#    Right, 15x  Left, 10x        ADL/Therapeutic Activities/Neuromuscular Re-Education:     Exercise/Equipment  Resistance/Repetitions   Other comments   Nuts and bolts x5    Thoracic ext Reviewed-tolerates 5 mins. At home daily  Supine on towel roll, 7 min   Added to HEP   To be completed daily for at least  min     Clothespins Pt placed clothespins of various colors and resistance from 1-8# onto vertical bars with his both his R and L hands using three-point pinch. Pinch/ strength Pt used a tea diffuser to grasp various size fabric balls and placed each in a plastic container to improve both  and pinch strength using each hand. Manual Treatments:     Exercise/Equipment  Resistance/Repetitions   Other comments                    Modalities:     Mode/Equipment  Resistance/Repetitions   Other comments                    Home Exercise Program:  provided HEP       ASSESSMENT:   Pt cont to tolerate forearm, wrist and hand strengthening in clinic this date. Encouraged increase participation in [address] per OT poc.      Treatment/Activity Tolerance:   [x] Patient tolerated treatment well [] Patient limited by fatique  [] Patient limited by pain [] Patient limited by other medical complications  [] Other:     GOALS: Goals established 10/3/22   Patient stated goal: \"improve rotator cuffs, forearms, and hand muscles\". Therapist goals for Patient  Short Term Goals:  to be met in 2 weeks (by 11/14/2022)  Pt will report consistent compliance with HEP at least 4x - Progressing, cont goal for consistency, 10/31/2022    Pt will demo MMT for bilateral wrist and froearms to 5/5 for improved performance with daily living tasks. - added 10/31/2022     Long Term Goals:  to be met in 4 weeks (by 11/28/2022)     Pt will increase bilaterally  strength to 60lbs to improve performance with daily living tasks that require power grasp. - Progressing, cont goal, 10/31/2022     Pt will Increase bilateral pinch to 25 lbs to improve performance with fine motor tasks such as manipulating screws and bolts. - Progressing, cont goal, 10/31/2022    Pt will demo improved [facility] for daily living tasks by completing NHPT in 20 seconds or less on each side. - added 10/31/2022    Pt will demo improved shoulder strength as evidence by ability to complete 15 reps of IR/ER at 15#.  - added 10/31/2022      Patient Requires Follow-up:  [x]  Yes  []  No    Plan: [x] Continue per plan of care 2x/week for 4 weeks [] Alter current plan (see comments)   [] Plan of care initiated [] Hold pending MD visit [] Discharge         Timed Code Treatment Minutes: 31 minutes    Total Treatment Time:  31 minutes      Interactive communication between patient's primary therapist and covering therapist was performed in order to ensure accurate information regarding the patient's current condition, treatment and planned interventions as well as recent or anticipated changes in the plan of care.        Electronically signed by:    Jennifer Ferreira OTR/[place] NP#[id number]

## 2022-11-07 NOTE — FLOWSHEET NOTE
Physical Therapy Daily Treatment Note    [x]Daily Treatment Note    []Progress Note/ Re-evaluation    [] Discharge Summary         Date:  2022    Patient Name:  Cherie Lipscomb    :  1990  MRN: [id number]      Medical/Treatment Diagnosis Information:  Diagnosis: L98.942Q, G16.902X, S14.153A  Treatment Diagnosis: LE weakness, abnormality of gait, imbalance    Insurance/Certification information:  PT Insurance Information: [facility] approved for 2x/week x 8 weeks (16 visits) from 10/3/22 to 22    Physician Information:  Referring Provider (secondary): Dr. Donnis Leventhal of care sent to provider:      [x]Faxed   []Co-signature    (attempts: 1[x]  10/27/22   2[] 3[])       Plan of care signed :  []  Yes  [x] No  []  Cosign [x]  Fax    Date of Patient follow up with Physician: before end of year, pt will check     Is this a Progress Report:     []  Yes  [x]  No      If Yes:  Date Range for reporting period:  Beginning 10/27/22  Ending     Progress report will be due (10 Rx or 30 days whichever is less): 17/51/10      Recertification will be due (POC Duration  / 90 days whichever is less): 23 or 16th visit         Visit # Insurance Allowable Auth Required     1 visit until 10/3/22  New  approved for 2x/week x 8 weeks from 10/3/22 to 22 [x]  Yes []  No        Functional Scale/Score:  Measure Used:     NDI  Score:      550            Date Assessed:  10/3/22      Measure Used:     LEFS  Score:      47/80            Date Assessed:  11/3/22         Latex Allergy:  [x]NO      []YES  Preferred Language for Healthcare:   [x]English       []other:    RESTRICTIONS/PRECAUTIONS:  SCI, neck surgery with fusion C3-7     SUBJECTIVE:    Pt 15 minutes late to PT appt today, reports he overslept since he was he was up late last night putting in new floors. Feels very stiff and weak this morning. Pt states he is trying to do HEP daily but has been busy working on his home.         Patient goal for therapy:   \"to get back to work and get stronger with legs and walking\"      Pain level:  No pain today. At eval:    intermittent pressure lower cervical and into traps  Patient reports pain is  1/10 pain at present and  3/10 pain at its worst.    Plan Moving Forward/ For next visit:    Progress trunk strength and stability  Progress LE strength and flexibility  Balance, gait, steps as able        OBJECTIVE:      Exercises/Interventions:     Exercises in bold performed in department today. Items not bolded are carried forward from prior visits for continuity of the record.   Exercise/Equipment hold sets reps resistance Comments/cues HEP              THERAPEUTIC EXERCISE      []   SLR supine  2 10   [x]   Bridges with tband for glut med activation      [x]   Standing heel raises      [x]   Standing hip abduction B      Can step to R if unable to do kick  [x]   Seated ankle dorsiflexion on R LE  2 15 Red Theraband  [x]   Sidelying clamshells  2 15  Cues to keep pelvis level [x]   Prone quadriceps stretch 30 1 3  3 repetitions on each side []                             []             []   NEUROMUSCULAR RE-ED      []     Bird Dog  2 15   []     Bridges with legs on [employer]  2 20   []     Hamstring Curls on [employer]   2 20   []    Marching on [employer] with B UE support   2 15  Pt relying heavily on his arms for support []     LAQ with B UE support on [employer]  2 10  Pt relying heavily on his arms for support []     Toe Taps to the cone in // bars  1 10  BUE support and CGA from PT with gait belt  []    Reciprocal Toe Taps to the cone in // bars   1 10  BUE support and CGA from PT with gait belt []   Static stance balance on blue foam in // bars 30 2   UE hovering with SBA from PT with gait belt  []    Mini Lunges on blue foam in // bars     BUE support and CGA from PT with gait belt  []                        Therapeutic Exercise/Home Exercise Program:   (15 minutes) 10:05 - 10:20    Added seated ankle dorsiflexion to HEP due to weakness noted with gait     HEP has been established, See above, pt to bring in his ex folder and exs previously given last visit    Pt inst in role of PT, prognosis, plan of care, activity modification, and benefits of therapy. Discussed aquatic therapy     HEP reviewed. See above chart        Therapeutic Activity:  0 minutes     Gait:  0 minutes:     Gait: pt ambulates with SPC, slow nimo, decreased knee extension B, forward posture. He states that his right knee \"locks\" on him from time to time with walking. It did not do it initially after proximal stabilization exercises (Neuromuscular Re-Education) but then happened. Pt with decreased right ankle dorsiflexion with gait due to weakness noted     Steps:  pt able to do 4 steps in dept with one rail and SPC with reciprocal pattern, but with slow nimo, decreased full knee extension on stance leg. Neuromuscular Re-Education: 09:45 - 10:05 (20 minutes) and 10:20-10:30 (10 minutes)   See chart above. Pre-gait activities in the parallel bars to improve balance and stability with loading response for gait. Canalith Repositioning Procedure:  0 minutes     Manual Therapy:  0 minutes    Modalities: 0 minutes        ASSESSMENT:  Pt continues to fatigue easily with strengthening therex. Gait training was initiated in the parallel bars with pre-gait activities to improve stability with single leg stance to allow for swing phase of the opposite limb. Rambo Simmons PT, DPT assisted this therapist with today's treatment while orienting to this physical therapy clinic. GOALS: Goals established 10/27/22     Patient stated goal:  \"to get back to work and get stronger with legs and walking\"  [] Progressing: [] Met: [] Not Met: [] Adjusted    Therapist goals for Patient:   Short Term Goals:  To be achieved in:  4 weeks   - Independent in HEP and progression per patient tolerance, in order to prevent re-injury. [] Progressing: [] Met: [] Not Met: [] Adjusted  - Improve strength by 1/3 grade in weak areas to allow for proper functional mobility as indicated by patients Functional Deficits. [] Progressing: [] Met: [] Not Met: [] Adjusted      Long Term Goals: To be achieved in:  8 weeks   - LEFS score 60/80 or better to assist with reaching prior level of function. [] Progressing: [] Met: [] Not Met: [] Adjusted   - Patient will have a decrease in pain to facilitate improvement in movement, function, and ADLs as indicated by Functional Deficits. [] Progressing: [] Met: [] Not Met: [] Adjusted  [x] NOT APPLICABLE    - Patient will demonstrate increased AROM to [facility] to allow for proper joint functioning as indicated by patients Functional Deficits. [] Progressing: [] Met: [] Not Met: [] Adjusted   - Improve strength by 2/3 grade or better in weak areas to allow for proper functional mobility as indicated by patients Functional Deficits. [] Progressing: [] Met: [] Not Met: [] Adjusted   - improve Tinetti score to 22/28 or better   [] Progressing: [] Met: [] Not Met: [] Adjusted   - Stair goal: pt able to do flight of steps with reciprocal pattern and SPC with improved technique   [] Progressing: [] Met: [] Not Met: [] Adjusted       Overall Progression Towards Functional goals/ Treatment Progress Update:  [] Patient is progressing as expected towards functional goals listed. [] Progression is slowed due to complexities/Impairments listed. [] Progression has been slowed due to co-morbidities.   [x] Plan just implemented, too soon to assess goals progression <30days   [] Goals require adjustment due to lack of progress  [] Patient is not progressing as expected and requires additional follow up with physician  [] Patient has met goals as marked above   [] Other    Prognosis for POC: [x] Good [] Fair  [] Poor    Patient requires continued skilled intervention: [x] Yes  [] No    Treatment/Activity Tolerance:  [x] Patient able to complete treatment  [] Patient limited by fatigue  [] Patient limited by pain    [] Patient limited by other medical complications  [] Other:         PLAN:   [] Continue per plan of care [] Alter current plan (see comments above)  [x] Plan of care initiated [] Hold pending MD visit [] Discharge          Therapeutic Exercise and NMR EXR  [x] (62161) Provided verbal/tactile cueing for activities related to strengthening, flexibility, endurance, ROM  for improvements in proximal strength and core control with self care, mobility, lifting and ambulation. [x] (97288) Provided verbal/tactile cueing for activities related to improving balance, coordination, kinesthetic sense, posture, motor skill, proprioception  to assist with core control in self care, mobility, lifting, and ambulation.      Therapeutic Activities and Gait:    [] (68923 or 58349) Provided verbal/tactile cueing for activities related to improving balance, coordination, kinesthetic sense, posture, motor skill, proprioception and motor activation to allow for proper function  with self care and ADLs  [] (34543) Provided training and instruction to the patient for proper core and proximal hip recruitment and positioning with ambulation re-education     Home Exercise Program:    [x] (16537) Reviewed/Progressed HEP activities related to strengthening, flexibility, endurance, ROM of core, proximal hip and LE for functional self-care, mobility, lifting and ambulation   [x] (09020) Reviewed/Progressed HEP activities related to improving balance, coordination, kinesthetic sense, posture, motor skill, proprioception of core, proximal hip and LE for self care, mobility, lifting, and ambulation      Manual Treatments:  PROM / STM / Oscillations-Mobs:  G-I, II, III, IV (PA's, Inf., Post.)  [] (17298) Provided manual therapy to mobilize soft tissue/joints for the purpose of modulating pain, promoting relaxation,  increasing ROM, reducing/eliminating soft tissue swelling/inflammation/restriction, improving soft tissue extensibility and allowing for proper ROM for normal function with self care, mobility, lifting and ambulation. CRP:  [] (93702) Canalith Repositioning procedure for the assessment, treatment and education of BPPV    Modalities:   [] Ultrasound   [] Estim    [] Mechanical traction    [] Vaso-pneumatic device   [] Ionto     Charges:  Timed Code Treatment Minutes: 45   Total Treatment Minutes: 45     Medicare Cap total YTD:      [x]N/A      [] $   Workers Comp Time Stamp      (Per CPT and Total Treatment)    Time In: 09:45 am   Time Out: 10:30 am       [] EVAL     [] Dry Needling  [x] TT(17579)   x  1   [] EStim Unattended 68133  [x] NMR (31173)  x  2  [] Estim Attended  62542  [] Manual (90494)  x      [] Mechanical Txn 58750  [] TA    x     [] Ultrasound  [] Gait   x                 [] Vaso  [] CRP    [] Ionto           [] Other:      Electronically signed by: Hailey Umanzor PT, DPT #[id number]           Note: If patient does not return for scheduled/ recommended follow up visits, this note will serve as a discharge from care along with most recent update on progress.

## 2022-11-09 ENCOUNTER — HOSPITAL ENCOUNTER (OUTPATIENT)
Dept: OCCUPATIONAL THERAPY | Age: 32
Setting detail: THERAPIES SERIES
Discharge: HOME | End: 2022-11-09
Payer: COMMERCIAL

## 2022-11-09 ENCOUNTER — HOSPITAL ENCOUNTER (OUTPATIENT)
Dept: PHYSICAL THERAPY | Age: 32
Setting detail: THERAPIES SERIES
Discharge: HOME | End: 2022-11-09
Payer: COMMERCIAL

## 2022-11-09 PROCEDURE — 97140 MANUAL THERAPY 1/> REGIONS: CPT

## 2022-11-09 PROCEDURE — 97110 THERAPEUTIC EXERCISES: CPT

## 2022-11-09 PROCEDURE — 97112 NEUROMUSCULAR REEDUCATION: CPT

## 2022-11-09 PROCEDURE — 97530 THERAPEUTIC ACTIVITIES: CPT

## 2022-11-09 NOTE — FLOWSHEET NOTE
Physical Therapy Daily Treatment Note    [x]Daily Treatment Note    []Progress Note    [] Discharge Summary         Date:  2022    Patient Name:  Alex Downs  \"HKIAV\"  :  1990  MRN: [id number]      Medical/Treatment Diagnosis Information:  Diagnosis: A55.781N, I36.631Z, F02.822D  Treatment Diagnosis: LE weakness, abnormality of gait, imbalance    Insurance/Certification information:  PT Insurance Information: [facility] approved for 2x/week x 8 weeks (16 visits) from 10/3/22 to 22    Physician Information:  Referring Provider (secondary): Dr. Rico Credit of care sent to provider:      [x]Faxed   []Co-signature    (attempts: 1[x]  10/27/22   2[] 3[])       Plan of care signed :  [x]  Yes  22    [] No  []  Cosign [x]  Fax    Date of Patient follow up with Physician: before end of year, pt will check     Is this a Progress Report:     []  Yes  [x]  No      If Yes:  Date Range for reporting period:  Beginning 10/27/22  Ending     Progress report will be due (10 Rx or 30 days whichever is less):       Recertification will be due (POC Duration  / 90 days whichever is less): 23 or 16th visit         Visit # Insurance Allowable Auth Required     1 visit until 10/3/22  New  approved for 2x/week x 8 weeks from 10/3/22 to 22 [x]  Yes []  No        Functional Scale/Score:  Measure Used:     NDI  Score:      5/50            Date Assessed:  10/3/22      Measure Used:     LEFS  Score:      47/80            Date Assessed:  11/3/22         Latex Allergy:  [x]NO      []YES  Preferred Language for Healthcare:   [x]English       []other:    RESTRICTIONS/PRECAUTIONS:  SCI, neck surgery with fusion C3-7     SUBJECTIVE:    Pt states he wants to do his exs here then he doesn't have to do them at the gym. States no new complaints        Patient goal for therapy:   \"to get back to work and get stronger with legs and walking\"      Pain level:  No pain today.    At eval: intermittent pressure lower cervical and into traps  Patient reports pain is  1/10 pain at present and  3/10 pain at its worst.    Plan Moving Forward/ For next visit:    Progress trunk strength and stability  Progress LE strength and flexibility  Balance, gait, steps as able        OBJECTIVE:      Exercises/Interventions:     Exercises in bold performed in department today. Items not bolded are carried forward from prior visits for continuity of the record.   Exercise/Equipment hold sets reps resistance Comments/cues HEP              THERAPEUTIC EXERCISE      []   Nustep seat 11 arms 11    Level 5 5 minutes, subjective info taken during ex     SLR supine  2 5  Cues for quad set, ankle DF and then lift, more difficult for pt [x]   Bridges with tband for glut med activation 5 3 10  Pt has red band at home, PT placed red band in cabinet with name [x]   Standing heel raises      [x]   Standing hip abduction B      Can step to R if unable to do kick  [x]   Seated ankle dorsiflexion on R LE  2 15 Red Theraband  [x]   Sidelying clamshells- added tband today to L leg only today, R leg without band  2 15 Red tband Cues to keep pelvis level [x]   Prone quadriceps stretch 30 1 3  3 repetitions on each side []   Modified plank 15  3  Cues for form   [x]     Attempted side plank modified      Unable to perform with good technique so did not complete             []             []   NEUROMUSCULAR RE-ED      []     Bird Dog (just did arms today due to poor form)   1 20   []     Bridges with legs on [employer]  2 20   []     Hamstring Curls on [employer]   1 20  Pt does hamstring curls at gym  []    Marching on [employer] with B UE support   2 15  Pt relying heavily on his arms for support []     LAQ with B UE support on [employer]  2 10  Pt relying heavily on his arms for support []     Toe Taps to the cone in // bars  1 10  BUE support and CGA from PT with gait belt  []    Reciprocal Toe Taps to the cone in // bars   1 10  BUE support and CGA from PT with gait belt []   Static stance balance on blue foam in // bars 30 2   UE hovering with SBA from PT with gait belt  []    Mini Lunges on blue foam in // bars     BUE support and CGA from PT with gait belt  []                        Therapeutic Exercise/Home Exercise Program:   (35 minutes)  [phone]  HEP has been established, See above, pt to bring in his ex folder each visit     Discussed with pt that we want to do skilled therapy here in the dept and work on things he necessarily is not or cannot do in the gym by himself. Pt agreeable           Therapeutic Activity:  0 minutes     Gait:  0 minutes:     Gait: pt ambulates with SPC, slow nimo, decreased knee extension B, forward posture. He states that his right knee \"locks\" on him from time to time with walking. It did not do it initially after proximal stabilization exercises (Neuromuscular Re-Education) but then happened. Pt with decreased right ankle dorsiflexion with gait due to weakness noted     Steps:  pt able to do 4 steps in dept with one rail and SPC with reciprocal pattern, but with slow nimo, decreased full knee extension on stance leg. Neuromuscular Re-Education: 09:23 - 10:33 (10 minutes)   See chart above. Canalith Repositioning Procedure:  0 minutes     Manual Therapy:  0 minutes    Modalities: 0 minutes        ASSESSMENT:      GOALS: Goals established 10/27/22     Patient stated goal:  \"to get back to work and get stronger with legs and walking\"  [] Progressing: [] Met: [] Not Met: [] Adjusted    Therapist goals for Patient:   Short Term Goals: To be achieved in:  4 weeks   - Independent in HEP and progression per patient tolerance, in order to prevent re-injury. [] Progressing: [] Met: [] Not Met: [] Adjusted  - Improve strength by 1/3 grade in weak areas to allow for proper functional mobility as indicated by patients Functional Deficits.    [] Progressing: [] Met: [] Not Met: [] Adjusted      Long Term Goals: To be achieved in:  8 weeks   - LEFS score 60/80 or better to assist with reaching prior level of function. [] Progressing: [] Met: [] Not Met: [] Adjusted   - Patient will have a decrease in pain to facilitate improvement in movement, function, and ADLs as indicated by Functional Deficits. [] Progressing: [] Met: [] Not Met: [] Adjusted  [x] NOT APPLICABLE    - Patient will demonstrate increased AROM to [facility] to allow for proper joint functioning as indicated by patients Functional Deficits. [] Progressing: [] Met: [] Not Met: [] Adjusted   - Improve strength by 2/3 grade or better in weak areas to allow for proper functional mobility as indicated by patients Functional Deficits. [] Progressing: [] Met: [] Not Met: [] Adjusted   - improve Tinetti score to 22/28 or better   [] Progressing: [] Met: [] Not Met: [] Adjusted   - Stair goal: pt able to do flight of steps with reciprocal pattern and SPC with improved technique   [] Progressing: [] Met: [] Not Met: [] Adjusted       Overall Progression Towards Functional goals/ Treatment Progress Update:  [] Patient is progressing as expected towards functional goals listed. [] Progression is slowed due to complexities/Impairments listed. [] Progression has been slowed due to co-morbidities.   [x] Plan just implemented, too soon to assess goals progression <30days   [] Goals require adjustment due to lack of progress  [] Patient is not progressing as expected and requires additional follow up with physician  [] Patient has met goals as marked above   [] Other    Prognosis for POC: [x] Good [] Fair  [] Poor    Patient requires continued skilled intervention: [x] Yes  [] No    Treatment/Activity Tolerance:  [x] Patient able to complete treatment  [] Patient limited by fatigue  [] Patient limited by pain    [] Patient limited by other medical complications  [] Other:         PLAN:   [x] Continue per plan of care [] Alter current plan (see comments above)  [] Plan of care initiated [] Hold pending MD visit [] Discharge          Therapeutic Exercise and NMR EXR  [x] (16895) Provided verbal/tactile cueing for activities related to strengthening, flexibility, endurance, ROM  for improvements in proximal strength and core control with self care, mobility, lifting and ambulation. [x] (11213) Provided verbal/tactile cueing for activities related to improving balance, coordination, kinesthetic sense, posture, motor skill, proprioception  to assist with core control in self care, mobility, lifting, and ambulation. Therapeutic Activities and Gait:    [] (95373 or 98002) Provided verbal/tactile cueing for activities related to improving balance, coordination, kinesthetic sense, posture, motor skill, proprioception and motor activation to allow for proper function  with self care and ADLs  [] (04390) Provided training and instruction to the patient for proper core and proximal hip recruitment and positioning with ambulation re-education     Home Exercise Program:    [x] (95881) Reviewed/Progressed HEP activities related to strengthening, flexibility, endurance, ROM of core, proximal hip and LE for functional self-care, mobility, lifting and ambulation   [x] (39423) Reviewed/Progressed HEP activities related to improving balance, coordination, kinesthetic sense, posture, motor skill, proprioception of core, proximal hip and LE for self care, mobility, lifting, and ambulation      Manual Treatments:  PROM / STM / Oscillations-Mobs:  G-I, II, III, IV (PA's, Inf., Post.)  [] (91676) Provided manual therapy to mobilize soft tissue/joints for the purpose of modulating pain, promoting relaxation,  increasing ROM, reducing/eliminating soft tissue swelling/inflammation/restriction, improving soft tissue extensibility and allowing for proper ROM for normal function with self care, mobility, lifting and ambulation.      CRP:  [] (21725) Canalith Repositioning procedure for the assessment, treatment and education of BPPV    Modalities:   [] Ultrasound   [] Estim    [] Mechanical traction    [] Vaso-pneumatic device   [] Ionto     Charges:  Timed Code Treatment Minutes: 45   Total Treatment Minutes: 45     Medicare Cap total YTD:      [x]N/A      [] $   Workers Comp Time Stamp      (Per CPT and Total Treatment)    Time In: 09:48 am   Time Out: 10:33 am       [] EVAL     [] Dry Needling  [x] PW(42173)   x  2   [] EStim Unattended 99197  [x] NMR (09430)  x  1  [] Estim Attended  05171  [] Manual (78345)  x      [] Mechanical Txn 97354  [] TA    x     [] Ultrasound  [] Gait   x                 [] Vaso  [] CRP    [] Ionto           [] Other:      Electronically signed by: Jazmin Hernandez PT, OMT-C, [id number]             Note: If patient does not return for scheduled/ recommended follow up visits, this note will serve as a discharge from care along with most recent update on progress.

## 2022-11-09 NOTE — FLOWSHEET NOTE
[address] [place] [address] and Therapy  [place] [address], [place], [facility]  Office: [phone]   Fax: [phone]    Occupational Therapy Daily Treatment Note    Date:  11/9/2022    Patient Name: Silas Subramanian      YOB: 1990        Medical Diagnosis: 514.152 Lesion at C2, 514.153A Lesion at C3, 514.154A Lesion at C4, 514.151A Lesion at C1     Treatment Diagnosis: Weakness in B UEs and impaired fine motor skills                 Referring Physician: Dr. Jolly Nicholson                                                                Onset Date:  September 2021       Visits Allowed/Insurance/Certification Information:  [facility] approved for 16 visits      Precautions/ Contra-indications:  SCI, neck surgery with fusion C3-7     Plan of Care signed: Yes, 10/13/2022; 11/2/2022    Progress Note: []  Yes  [x]  No  Next due by:  11/28/22    Visit #: 2/8, 5/8    Pain Level:   3/10 arms and neck    SUBJECTIVE:  Pt reports compliance with OT HEP. Pt reports plans to go to gym after therapy. Plan for Next Session  [facility] and hand wrist strengthening      OBJECTIVE:   Strength  Muscle  (*denotes pain) Right     Left          DATE 10/3/2022 10/31    10/3/2022  10/31     Shoulder Flexion  5  5   5 5      Shoulder Abduction  4+  5   5  5     Elbow Flexion 4+  5   5  5     Elbow Extension 5 5    5 5      Pronation 4-  4+   4-  4+     Supination 4-  4+   4-  4+     Wrist Flexion 4-  4+   4  4+     Wrist Extension 4-  4+   4  4+     Comments:      Strength (Dynamometry) and Pinch Strength            In lbs.  Right   Left   Date 10/3/2022  10/31     10/3/2022  10/31      - Position Two 49.6  56     44.6  51.3                       Lateral Pinch 16.6  17     20.6  20.3     Three Point PInch 12.3  13.6     15.3  15.6                       Coordination                  9 Hole Peg Test 34  27     29  25     Comments: 10/31: demo supination during  strength testing  Sensation better in left than right. Assessment of UE tone/spasticity:    Date: 10/27/22  R/L      Shoulder flexors 0/0      Internal rotators 0/0      External rotators 1+/0      Elbow flexors 0/0      Elbow extensor 1/0      Supinators 1/0      Pronators 0/1      Wrist flexors 0/0      Wrist extensors 0/0      Digit flexors 0/0      Digit extensors 1/0        Modified Radha Scale  0    No increase in muscle tone  1    Slight increase in muscle tone, manifested by a catch and release or by minimal        resistance at the end of the range of motion when the affected part(s) is moved in        flexion or extension  1+ Slight increase in muscle tone, manifested by a catch, followed by minimal        resistance throughout the remainder (less than half) of the ROM  2    More marked increase in muscle tone through most of the ROM, but        affected part(s) easily moved  3    Considerable increase in muscle tone, passive movement difficult  4    Affected part(s) rigid in flexion or extension       Exercises:    Exercises in bold performed in department today. Items not bolded are carried forward from prior visits for continuity of the record. Therapeutic Exercise/Neuromuscular Re-Education:   Exercise/Equipment  Resistance/Repetitions   Other comments   Theraputty exercises   , pinch, spreading out putty  x10 Pink  Reviewed for HEP   Wrist strengthening 5Ib hand weights  Rx10, L x10    Seated at cables using 5#  Wrist ext, 15x each side  Wrist flex, 15x each side    FlexBar - Green  Frowns, 10 reps x2  Smiles, 10 reps x2  Twists, 10 reps x2 (both directions) Reviewed for HEP   Forearm  Supination/pronation with 5lb wt. Instructed re: stretching R forearm in supination-holding for 10 secs. Reviewed for HEP   Rotator cuff  Discussed strengthening rotators, wrist and forearms several times each week instead of once at the gym.   Pt was given green theraband and was Instructed re: IR/ER exercises using the doorway frame in standing and in seated. Performed in standing IR and ER 10x each with each arm. Reviewed these exercises with pt-he plans on adding these to his workout routine this week. Seated at cables  RUE  IR, 15x at 5#, 15x at 10#  ER, 10x at 5#    LUE  IR, 15x at 5#, 15x at 10#  ER, 15x at 5#, 10x at 10#   Reviewed for HEP   In-hand manipulation/FMC Tissue crumble 10x each hand    Tear, crumble and flick  Tear paper towel into strips  Crumble each strip into balls  Flick balls, thumb and each digit   Reviewed for HEP    strengthening 35#    Right, 15 reps x3  Left, 15 reps x3      Stretches and self trigger point release Exercises  Upper Trapezius Stretch -   2 x daily - 7 x weekly - 20 sec hold    Self trigger point release with tennis ball -   7 x weekly - 60-90 sec hold Access Code: [email or web address]  URL: [email or web address]  Date: 11/09/2022  Prepared by: Amanda Mondragon      Provided info re: [employer] for purchase if desired         ADL/Therapeutic Activities/Neuromuscular Re-Education:     Exercise/Equipment  Resistance/Repetitions   Other comments   Nuts and bolts x5    Thoracic ext Reviewed-tolerates 5 mins. At home daily  Supine on towel roll, 7 min   Added to HEP   To be completed daily for at least  min     Clothespins Pt placed clothespins of various colors and resistance from 1-8# onto vertical bars with his both his R and L hands using three-point pinch. Pinch/ strength Pt used a tea diffuser to grasp various size fabric balls and placed each in a plastic container to improve both  and pinch strength using each hand.            Manual Treatments:     Exercise/Equipment  Resistance/Repetitions   Other comments   Trigger point release To bilateral upper trap followed by STM \"It feels better\"               Modalities:     Mode/Equipment  Resistance/Repetitions   Other comments                    Home Exercise Program:  provided HEP       ASSESSMENT:   Pt tolerating trigger point release, performed d/t c/o pain in upper traps. Added upper trap stretch to HEP and educated on self trigger point release. Encouraged cont participation in [address] Cont per OT poc. Treatment/Activity Tolerance:   [x] Patient tolerated treatment well [] Patient limited by fatique  [] Patient limited by pain [] Patient limited by other medical complications  [] Other:     GOALS: Goals established 10/3/22   Patient stated goal: \"improve rotator cuffs, forearms, and hand muscles\". Therapist goals for Patient  Short Term Goals:  to be met in 2 weeks (by 11/14/2022)  Pt will report consistent compliance with HEP at least 4x - Progressing, cont goal for consistency, 10/31/2022    Pt will demo MMT for bilateral wrist and froearms to 5/5 for improved performance with daily living tasks. - added 10/31/2022     Long Term Goals:  to be met in 4 weeks (by 11/28/2022)     Pt will increase bilaterally  strength to 60lbs to improve performance with daily living tasks that require power grasp. - Progressing, cont goal, 10/31/2022     Pt will Increase bilateral pinch to 25 lbs to improve performance with fine motor tasks such as manipulating screws and bolts. - Progressing, cont goal, 10/31/2022    Pt will demo improved [address] for daily living tasks by completing NHPT in 20 seconds or less on each side.   - added 10/31/2022    Pt will demo improved shoulder strength as evidence by ability to complete 15 reps of IR/ER at 15#.  - added 10/31/2022      Patient Requires Follow-up:  [x]  Yes  []  No    Plan: [x] Continue per plan of care 2x/week for 4 weeks [] Alter current plan (see comments)   [] Plan of care initiated [] Hold pending MD visit [] Discharge         Timed Code Treatment Minutes: 39 minutes    Total Treatment Time:  39 minutes      Interactive communication between patient's primary therapist and covering therapist was performed in order to ensure accurate information regarding the patient's current condition, treatment and planned interventions as well as recent or anticipated changes in the plan of care.        Electronically signed by:    Epifanio Dean OTR/[facility] MEDRANO#[id number]

## 2022-11-14 ENCOUNTER — HOSPITAL ENCOUNTER (OUTPATIENT)
Dept: OCCUPATIONAL THERAPY | Age: 32
Setting detail: THERAPIES SERIES
Discharge: HOME | End: 2022-11-14
Payer: COMMERCIAL

## 2022-11-14 NOTE — PROGRESS NOTES
[facility] and Therapy  [address], [place], [facility]  Office: [phone]   Fax: [phone]      Patient Name:  Lg Lucas  :  1990   Date:  2022  Cancels to Date: 2  No-shows to Date: 0    For today's appointment patient:  [x] Cancelled  [] Rescheduled appointment  [] No-show     Reason given by patient:  [] Patient ill  [] Conflicting appointment  [] No transportation    [] Conflict with work  [] No reason given  [x] Other:     Comments:  \"It's too cold to function. \"        Electronically signed by: Yusra Hays OTR/[facility] DM#[id number]

## 2022-11-16 ENCOUNTER — HOSPITAL ENCOUNTER (OUTPATIENT)
Dept: PHYSICAL THERAPY | Age: 32
Setting detail: THERAPIES SERIES
Discharge: HOME | End: 2022-11-16
Payer: COMMERCIAL

## 2022-11-16 ENCOUNTER — HOSPITAL ENCOUNTER (OUTPATIENT)
Dept: OCCUPATIONAL THERAPY | Age: 32
Setting detail: THERAPIES SERIES
Discharge: HOME | End: 2022-11-16
Payer: COMMERCIAL

## 2022-11-16 PROCEDURE — 97110 THERAPEUTIC EXERCISES: CPT

## 2022-11-16 PROCEDURE — 97112 NEUROMUSCULAR REEDUCATION: CPT

## 2022-11-16 PROCEDURE — 97530 THERAPEUTIC ACTIVITIES: CPT

## 2022-11-16 NOTE — FLOWSHEET NOTE
Physical Therapy Daily Treatment Note    [x]Daily Treatment Note    []Progress Note    [] Discharge Summary         Date:  2022    Patient Name:  Yaz Thompson  \"VLYNB\"  :  1990  MRN: [id number]      Medical/Treatment Diagnosis Information:  Diagnosis: C12.667T, H30.701G, J77.819J  Treatment Diagnosis: LE weakness, abnormality of gait, imbalance    Insurance/Certification information:  PT Insurance Information: [facility] approved for 2x/week x 8 weeks (16 visits) from 10/3/22 to 22    Physician Information:  Referring Provider (secondary): Dr. Sanya Egan of care sent to provider:      [x]Faxed   []Co-signature    (attempts: 1[x]  10/27/22   2[] 3[])       Plan of care signed :  [x]  Yes  22    [] No  []  Cosign [x]  Fax    Date of Patient follow up with Physician: before end of year, pt will check     Is this a Progress Report:     []  Yes  [x]  No      If Yes:  Date Range for reporting period:  Beginning 10/27/22  Ending     Progress report will be due (10 Rx or 30 days whichever is less):       Recertification will be due (POC Duration  / 90 days whichever is less): 23 or 16th visit         Visit # Insurance Allowable Auth Required     1 visit until 10/3/22  New  approved for 2x/week x 8 weeks from 10/3/22 to 22 [x]  Yes []  No        Functional Scale/Score:  Measure Used:     NDI  Score:      5/50            Date Assessed:  10/3/22      Measure Used:     LEFS  Score:      47/80            Date Assessed:  11/3/22         Latex Allergy:  [x]NO      []YES  Preferred Language for Healthcare:   [x]English       []other:    RESTRICTIONS/PRECAUTIONS:  SCI, neck surgery with fusion C3-7     SUBJECTIVE:    States no new complaints        Patient goal for therapy:   \"to get back to work and get stronger with legs and walking\"      Pain level:  No pain today.    At eval:    intermittent pressure lower cervical and into traps  Patient reports pain is  1/10 pain at present and  3/10 pain at its worst.    Plan Moving Forward/ For next visit:    Progress trunk strength and stability  Progress LE strength and flexibility  Balance, gait, steps as able        OBJECTIVE:      Exercises/Interventions:     Exercises in bold performed in department today. Items not bolded are carried forward from prior visits for continuity of the record.   Exercise/Equipment hold sets reps resistance Comments/cues HEP              THERAPEUTIC EXERCISE      []   Nustep seat 11 arms 11    Level 5 5 minutes, subjective info taken during ex     SLR supine  2 5  Cues for quad set, ankle DF and then lift, more difficult for pt [x]   Bridges with tband for glut med activation 5 3 10  Pt has red band at home, PT placed red band in cabinet with name [x]   Standing heel raises  2 15   [x]   Standing hip abduction B      Can step to R if unable to do kick  [x]   Seated ankle dorsiflexion on R LE  2 15 Red Theraband  [x]   Sidelying clamshells- added tband today to L leg only today, R leg without band  2 15 Red tband Cues to keep pelvis level [x]   Prone quadriceps stretch 30 1 3  3 repetitions on each side []   Modified plank 15  3  Cues for form   [x]     Attempted side plank modified      Unable to perform with good technique so did not complete             []             []   NEUROMUSCULAR RE-ED      []     Bird Dog (just did arms today due to poor form)   1 20   []     Bridges with legs on [employer]  2 20   []     Hamstring Curls on [employer]   1 20  Pt does hamstring curls at gym  []    Marching on [employer] with B UE support   2 15  Pt relying heavily on his arms for support []     LAQ with B UE support on [employer]  2 10  Pt relying heavily on his arms for support []     Toe Taps to the cone in // bars  1 10  BUE support and CGA from PT with gait belt  []    Reciprocal Toe Taps to the cone in // bars   1 10  BUE support and CGA from PT with gait belt []   Static stance balance on blue foam in // bars 30 2   UE hovering with SBA from PT with gait belt  []    Mini Lunges on blue foam in // bars     BUE support and CGA from PT with gait belt  []    Step ups   1 7  6\" step, BUEs    Sidestepping in // bars    1 lap  up and back    Step taps to 6\" step   1 15  BUES, R side very difficult    squats in // bars   1 10  BUEs    Stance with one foot on 6\" step and other on floor, done B  30 sec  2  No UEs     Standing balance activities firm surface     Feet apart, feet together, feet half tandem    Conditions 1-6  CGA                        Therapeutic Exercise/Home Exercise Program:   (10 minutes)  [phone]  HEP has been established, See above  pt to bring in his ex folder each visit     Discussed with pt that we want to do skilled therapy here in the dept and work on things he necessarily is not or cannot do in the gym by himself. Therapeutic Activity:  0 minutes     Gait:  0 minutes:   Gait: pt ambulates with SPC, slow nimo, decreased knee extension B, forward posture. He states that his right knee \"locks\" on him from time to time with walking. It did not do it initially after proximal stabilization exercises (Neuromuscular Re-Education) but then happened. Pt with decreased right ankle dorsiflexion with gait due to weakness noted     Steps:  pt able to do 4 steps in dept with one rail and SPC with reciprocal pattern, but with slow nimo, decreased full knee extension on stance leg. Neuromuscular Re-Education:  (33 minutes) [phone]  See chart above. Canalith Repositioning Procedure:  0 minutes     Manual Therapy:  0 minutes    Modalities: 0 minutes        ASSESSMENT:      GOALS: Goals established 10/27/22     Patient stated goal:  \"to get back to work and get stronger with legs and walking\"  [] Progressing: [] Met: [] Not Met: [] Adjusted    Therapist goals for Patient:   Short Term Goals:  To be achieved in:  4 weeks   - Independent in HEP and progression per patient tolerance, in order to prevent re-injury. [] Progressing: [] Met: [] Not Met: [] Adjusted  - Improve strength by 1/3 grade in weak areas to allow for proper functional mobility as indicated by patients Functional Deficits. [] Progressing: [] Met: [] Not Met: [] Adjusted      Long Term Goals: To be achieved in:  8 weeks   - LEFS score 60/80 or better to assist with reaching prior level of function. [] Progressing: [] Met: [] Not Met: [] Adjusted   - Patient will have a decrease in pain to facilitate improvement in movement, function, and ADLs as indicated by Functional Deficits. [] Progressing: [] Met: [] Not Met: [] Adjusted  [x] NOT APPLICABLE    - Patient will demonstrate increased AROM to [facility] to allow for proper joint functioning as indicated by patients Functional Deficits. [] Progressing: [] Met: [] Not Met: [] Adjusted   - Improve strength by 2/3 grade or better in weak areas to allow for proper functional mobility as indicated by patients Functional Deficits. [] Progressing: [] Met: [] Not Met: [] Adjusted   - improve Tinetti score to 22/28 or better   [] Progressing: [] Met: [] Not Met: [] Adjusted   - Stair goal: pt able to do flight of steps with reciprocal pattern and SPC with improved technique   [] Progressing: [] Met: [] Not Met: [] Adjusted       Overall Progression Towards Functional goals/ Treatment Progress Update:  [] Patient is progressing as expected towards functional goals listed. [] Progression is slowed due to complexities/Impairments listed. [] Progression has been slowed due to co-morbidities.   [x] Plan just implemented, too soon to assess goals progression <30days   [] Goals require adjustment due to lack of progress  [] Patient is not progressing as expected and requires additional follow up with physician  [] Patient has met goals as marked above   [] Other    Prognosis for POC: [x] Good [] Fair  [] Poor    Patient requires continued skilled intervention: [x] Yes  [] No    Treatment/Activity Tolerance:  [x] Patient able to complete treatment  [] Patient limited by fatigue  [] Patient limited by pain    [] Patient limited by other medical complications  [] Other:         PLAN:   [x] Continue per plan of care [] Alter current plan (see comments above)  [] Plan of care initiated [] Hold pending MD visit [] Discharge          Therapeutic Exercise and NMR EXR  [x] (74522) Provided verbal/tactile cueing for activities related to strengthening, flexibility, endurance, ROM  for improvements in proximal strength and core control with self care, mobility, lifting and ambulation. [x] (10546) Provided verbal/tactile cueing for activities related to improving balance, coordination, kinesthetic sense, posture, motor skill, proprioception  to assist with core control in self care, mobility, lifting, and ambulation.      Therapeutic Activities and Gait:    [] (86306 or 29431) Provided verbal/tactile cueing for activities related to improving balance, coordination, kinesthetic sense, posture, motor skill, proprioception and motor activation to allow for proper function  with self care and ADLs  [] (19455) Provided training and instruction to the patient for proper core and proximal hip recruitment and positioning with ambulation re-education     Home Exercise Program:    [x] (61818) Reviewed/Progressed HEP activities related to strengthening, flexibility, endurance, ROM of core, proximal hip and LE for functional self-care, mobility, lifting and ambulation   [x] (30583) Reviewed/Progressed HEP activities related to improving balance, coordination, kinesthetic sense, posture, motor skill, proprioception of core, proximal hip and LE for self care, mobility, lifting, and ambulation      Manual Treatments:  PROM / STM / Oscillations-Mobs:  G-I, II, III, IV (PA's, Inf., Post.)  [] (59955) Provided manual therapy to mobilize soft tissue/joints for the purpose of modulating pain, promoting relaxation,  increasing ROM, reducing/eliminating soft tissue swelling/inflammation/restriction, improving soft tissue extensibility and allowing for proper ROM for normal function with self care, mobility, lifting and ambulation. CRP:  [] (25871) Canalith Repositioning procedure for the assessment, treatment and education of BPPV    Modalities:   [] Ultrasound   [] Estim    [] Mechanical traction    [] Vaso-pneumatic device   [] Ionto     Charges:  Timed Code Treatment Minutes: 43   Total Treatment Minutes: 43     Medicare Cap total YTD:      [x]N/A      [] $   Workers Comp Time Stamp      (Per CPT and Total Treatment)    Time In: 09:48 am   Time Out: 10:33 am       [] EVAL     [] Dry Needling  [x] RF(44622)   x  1   [] EStim Unattended 51916  [x] NMR (03106)  x  2  [] Estim Attended  54451  [] Manual (22963)  x      [] Mechanical Txn 38899  [] TA    x     [] Ultrasound  [] Gait   x                 [] Vaso  [] CRP    [] Ionto           [] Other:      Electronically signed by: Faisal Mcclellan PT, OMT-C, [id number]             Note: If patient does not return for scheduled/ recommended follow up visits, this note will serve as a discharge from care along with most recent update on progress.

## 2022-11-16 NOTE — FLOWSHEET NOTE
[address] [place] [address] and Therapy  [address] [place] [address] Rd, [place], [facility]  Office: [phone]   Fax: [phone]    Occupational Therapy Daily Treatment Note    Date:  11/16/2022    Patient Name: Colt Ramos      YOB: 1990        Medical Diagnosis: 514.152 Lesion at C2, 514.153A Lesion at C3, 514.154A Lesion at C4, 514.151A Lesion at C1     Treatment Diagnosis: Weakness in B UEs and impaired fine motor skills                 Referring Physician: Dr. Lori Spaulding                                                                Onset Date:  September 2021       Visits Allowed/Insurance/Certification Information:  [facility] approved for 16 visits      Precautions/ Contra-indications:  SCI, neck surgery with fusion C3-7     Plan of Care signed: Yes, 10/13/2022; 11/2/2022    Progress Note: []  Yes  [x]  No  Next due by:  11/28/22    Visit #: 3/8, 5/8    Pain Level:   2-3/10 arms and neck    SUBJECTIVE:  Pt reports poor sleep due to RLE spasms and \"nerves are going insane\" in right arm. Reports compliance with OT HEP, 2x last week and rotator cuff exercises gym yesterday. Reports he completed the work on his living room floor. Plan for Next Session  [facility] and hand/wrist strengthening  Ball walks      OBJECTIVE:   Strength  Muscle  (*denotes pain) Right     Left          DATE 10/3/2022 10/31    10/3/2022  10/31     Shoulder Flexion  5  5   5 5      Shoulder Abduction  4+  5   5  5     Elbow Flexion 4+  5   5  5     Elbow Extension 5 5    5 5      Pronation 4-  4+   4-  4+     Supination 4-  4+   4-  4+     Wrist Flexion 4-  4+   4  4+     Wrist Extension 4-  4+   4  4+     Comments:      Strength (Dynamometry) and Pinch Strength            In lbs.  Right   Left   Date 10/3/2022  10/31     10/3/2022  10/31      - Position Two 49.6  56     44.6  51.3                       Lateral Pinch 16.6  17     20.6  20.3     Three Point PInch 12.3  13.6     15.3  15.6 Coordination                  9 Hole Peg Test 34  27     29  25     Comments: 10/31: demo supination during  strength testing  Sensation better in left than right. Assessment of UE tone/spasticity:    Date: 10/27/22  R/L      Shoulder flexors 0/0      Internal rotators 0/0      External rotators 1+/0      Elbow flexors 0/0      Elbow extensor 1/0      Supinators 1/0      Pronators 0/1      Wrist flexors 0/0      Wrist extensors 0/0      Digit flexors 0/0      Digit extensors 1/0        Modified Radha Scale  0    No increase in muscle tone  1    Slight increase in muscle tone, manifested by a catch and release or by minimal        resistance at the end of the range of motion when the affected part(s) is moved in        flexion or extension  1+ Slight increase in muscle tone, manifested by a catch, followed by minimal        resistance throughout the remainder (less than half) of the ROM  2    More marked increase in muscle tone through most of the ROM, but        affected part(s) easily moved  3    Considerable increase in muscle tone, passive movement difficult  4    Affected part(s) rigid in flexion or extension       Exercises:    Exercises in bold performed in department today. Items not bolded are carried forward from prior visits for continuity of the record.     Therapeutic Exercise/Neuromuscular Re-Education:   Exercise/Equipment  Resistance/Repetitions   Other comments   Theraputty exercises   , pinch, spreading out putty  x10 Pink  Reviewed for HEP   Wrist strengthening 5# hand weights (completed with right and left hand)  Wrist flex, 10x  Wrist ext, 10x  Supination/pronation, 10x    4# hand wrist (both hand)  Radial deviation, 10x  Ulnar deviation, 10x    Seated at cables using 5#  Wrist ext, 15x each side  Wrist flex, 15x each side    FlexBar - Green  Frowns, 10 reps x2  Smiles, 10 reps x2  Twists, 10 reps x2 (both directions) Reviewed for HEP   Rotator cuff  Discussed Treatments:     Exercise/Equipment  Resistance/Repetitions   Other comments   Trigger point release To bilateral upper trap followed by STM \"It feels better\"               Modalities:     Mode/Equipment  Resistance/Repetitions   Other comments                    Home Exercise Program:  provided HEP       ASSESSMENT:   Pt tolerating bilateral wrist, hand and finger strengthening this date. Encouraged cont participation in [address] Cont per OT poc. Treatment/Activity Tolerance:   [x] Patient tolerated treatment well [] Patient limited by fatique  [] Patient limited by pain [] Patient limited by other medical complications  [] Other:     GOALS: Goals established 10/3/22   Patient stated goal: \"improve rotator cuffs, forearms, and hand muscles\". Therapist goals for Patient  Short Term Goals:  to be met in 2 weeks (by 11/14/2022)  Pt will report consistent compliance with HEP at least 4x - Progressing, cont goal for consistency, 10/31/2022    Pt will demo MMT for bilateral wrist and froearms to 5/5 for improved performance with daily living tasks. - added 10/31/2022     Long Term Goals:  to be met in 4 weeks (by 11/28/2022)     Pt will increase bilaterally  strength to 60lbs to improve performance with daily living tasks that require power grasp. - Progressing, cont goal, 10/31/2022     Pt will Increase bilateral pinch to 25 lbs to improve performance with fine motor tasks such as manipulating screws and bolts. - Progressing, cont goal, 10/31/2022    Pt will demo improved [address] for daily living tasks by completing NHPT in 20 seconds or less on each side.   - added 10/31/2022    Pt will demo improved shoulder strength as evidence by ability to complete 15 reps of IR/ER at 15#.  - added 10/31/2022      Patient Requires Follow-up:  [x]  Yes  []  No    Plan: [x] Continue per plan of care 2x/week for 4 weeks [] Alter current plan (see comments)   [] Plan of care initiated [] Hold pending MD visit [] Discharge         Timed Code Treatment Minutes: 40 minutes    Total Treatment Time:  40 minutes      Interactive communication between patient's primary therapist and covering therapist was performed in order to ensure accurate information regarding the patient's current condition, treatment and planned interventions as well as recent or anticipated changes in the plan of care.        Electronically signed by:    Rashmi TOLLIVER/REMINGTON SHELBY#[id number]

## 2022-11-21 ENCOUNTER — HOSPITAL ENCOUNTER (OUTPATIENT)
Dept: OCCUPATIONAL THERAPY | Age: 32
Setting detail: THERAPIES SERIES
Discharge: HOME | End: 2022-11-21
Payer: COMMERCIAL

## 2022-11-21 ENCOUNTER — HOSPITAL ENCOUNTER (OUTPATIENT)
Dept: PHYSICAL THERAPY | Age: 32
Setting detail: THERAPIES SERIES
Discharge: HOME | End: 2022-11-21
Payer: COMMERCIAL

## 2022-11-21 PROCEDURE — 97110 THERAPEUTIC EXERCISES: CPT

## 2022-11-21 PROCEDURE — 97530 THERAPEUTIC ACTIVITIES: CPT

## 2022-11-21 NOTE — FLOWSHEET NOTE
[address] [place] [address] and Therapy  [facility] [address], [place], [facility]  Office: [phone]   Fax: [phone]    Occupational Therapy Daily Treatment Note    Date:  11/21/2022    Patient Name: Desirae Bassett      YOB: 1990        Medical Diagnosis: 514.152 Lesion at C2, 514.153A Lesion at C3, 514.154A Lesion at C4, 514.151A Lesion at C1     Treatment Diagnosis: Weakness in B UEs and impaired fine motor skills                 Referring Physician: Dr. Momo Carlton                                                                Onset Date:  September 2021       Visits Allowed/Insurance/Certification Information:  [id number] approved for 16 visits through 11/26/2022      Precautions/ Contra-indications:  SCI, neck surgery with fusion C3-7     Plan of Care signed: Yes, 10/13/2022; 11/2/2022    Progress Note: []  Yes  [x]  No      Next due by:  11/26/22    Visit #: 4/8, 5/8    Pain Level:   1/10 arms and neck    SUBJECTIVE:  Pt requesting a note for his  stating that he is unable to community service. (Recommended pt get this letter from a medical doctor.) Reports compliance with HEP 2-3x per week. Plan for Next Session  [facility] and hand/wrist strengthening  Ball walks      OBJECTIVE:   Strength  Muscle  (*denotes pain) Right      Left          DATE 10/3/2022 10/31     10/3/2022  10/31     Shoulder Flexion  5  5    5 5      Shoulder Abduction  4+  5    5  5     Elbow Flexion 4+  5    5  5     Elbow Extension 5 5     5 5      Pronation 4-  4+    4-  4+     Supination 4-  4+    4-  4+     Wrist Flexion 4-  4+    4  4+     Wrist Extension 4-  4+    4  4+     Comments:      Strength (Dynamometry) and Pinch Strength            In lbs.  Right   Left   Date 10/3/2022  10/31     10/3/2022  10/31      - Position Two 49.6  56     44.6  51.3                       Lateral Pinch 16.6  17     20.6  20.3     Three Point PInch 12.3  13.6     15.3  15.6 Coordination                  9 Hole Peg Test 34  27     29  25     Comments: 10/31: demo supination during  strength testing  Sensation better in left than right. Assessment of UE tone/spasticity:    Date: 10/27/22  R/L      Shoulder flexors 0/0      Internal rotators 0/0      External rotators 1+/0      Elbow flexors 0/0      Elbow extensor 1/0      Supinators 1/0      Pronators 0/1      Wrist flexors 0/0      Wrist extensors 0/0      Digit flexors 0/0      Digit extensors 1/0        Modified Radha Scale  0    No increase in muscle tone  1    Slight increase in muscle tone, manifested by a catch and release or by minimal        resistance at the end of the range of motion when the affected part(s) is moved in        flexion or extension  1+ Slight increase in muscle tone, manifested by a catch, followed by minimal        resistance throughout the remainder (less than half) of the ROM  2    More marked increase in muscle tone through most of the ROM, but        affected part(s) easily moved  3    Considerable increase in muscle tone, passive movement difficult  4    Affected part(s) rigid in flexion or extension       Exercises:    Exercises in bold performed in department today. Items not bolded are carried forward from prior visits for continuity of the record.     Therapeutic Exercise/Neuromuscular Re-Education:   Exercise/Equipment  Resistance/Repetitions   Other comments   Theraputty exercises   , pinch, spreading out putty  x10 Pink  Reviewed for HEP   Wrist strengthening 5# hand weights (completed with right and left hand)  Wrist flex, 10x  Wrist ext, 10x  Supination/pronation, 10x  Radial deviation, 10x  Ulnar deviation, 10x      Seated at cables using 5#  Wrist ext, 15x each side  Wrist flex, 15x each side    FlexBar - Green  Frowns, 15 reps x2  Smiles, 15 reps x2  Twists, 12 reps x2 (both directions) Reviewed for HEP   Rotator cuff  Discussed strengthening rotators, wrist and forearms several times each week instead of once at the gym. Pt was given green theraband and was Instructed re: IR/ER exercises using the doorway frame in standing and in seated. Performed in standing IR and ER 10x each with each arm. Reviewed these exercises with pt-he plans on adding these to his workout routine this week. Seated at [facility]  RUE  IR, 15x at 15#,  ER, 3x at 10#; 10x at 5#    LUE  IR, 15x at 15#  ER, 3x at 15#, 10x at 10#   Reviewed for HEP   In-hand manipulation/FMC Tissue crumble 10x each hand    Tear, crumble and flick  Tear paper towel into strips  Crumble each strip into balls  Flick balls, thumb and each digit    Purdue Pegboard  Place and remove pegs, spacers, washers  10 each hand  Used tweezers to remove   Reviewed for HEP   Pinch strengthening Black clothespins for 15 pom-poms  Lateral pinch, 1x each hand  3pt pinch, 1x each hand    Tea Tongs for 15 pom-poms  Hook grasp, 1x each hand     strengthening 35#    Right, 15 reps x3  Left, 15 reps x3      Stretches and self trigger point release Exercises  Upper Trapezius Stretch -   2 x daily - 7 x weekly - 20 sec hold    Self trigger point release with tennis ball -   7 x weekly - 60-90 sec hold Access Code: [email or web address]  URL: [email or web address]  Date: 11/09/2022  Prepared by: Gregorio Espitia      Provided info re: Thera-Cane for purchase if desired         ADL/Therapeutic Activities/Neuromuscular Re-Education:     Exercise/Equipment  Resistance/Repetitions   Other comments   Nuts and bolts x5    Thoracic ext Reviewed-tolerates 5 mins. At home daily  Supine on towel roll, 7 min   Added to HEP   To be completed daily for at least  min     Clothespins Pt placed clothespins of various colors and resistance from 1-8# onto vertical bars with his both his R and L hands using three-point pinch.       Pinch/ strength Pt used a tea diffuser to grasp various size fabric balls and placed each in a plastic container to improve both  and pinch strength using each hand. Manual Treatments:     Exercise/Equipment  Resistance/Repetitions   Other comments   Trigger point release To bilateral upper trap followed by STM \"It feels better\"               Modalities:     Mode/Equipment  Resistance/Repetitions   Other comments                    Home Exercise Program:  provided HEP       ASSESSMENT:   Pt tolerating bilateral wrist, hand and finger strengthening this date. Encouraged cont participation in [address] Cont per OT poc. Treatment/Activity Tolerance:   [x] Patient tolerated treatment well [] Patient limited by fatique  [] Patient limited by pain [] Patient limited by other medical complications  [] Other:     GOALS: Goals established 10/3/22   Patient stated goal: \"improve rotator cuffs, forearms, and hand muscles\". Therapist goals for Patient  Short Term Goals:  to be met in 2 weeks (by 11/14/2022)  Pt will report consistent compliance with HEP at least 4x - Progressing, reports \"roughly 2-3x\" per week, 11/21/2022    Pt will demo MMT for bilateral wrist and froearms to 5/5 for improved performance with daily living tasks. - added 10/31/2022     Long Term Goals:  to be met in 4 weeks (by 11/28/2022)     Pt will increase bilaterally  strength to 60lbs to improve performance with daily living tasks that require power grasp. - Progressing, cont goal, 10/31/2022     Pt will Increase bilateral pinch to 25 lbs to improve performance with fine motor tasks such as manipulating screws and bolts. - Progressing, cont goal, 10/31/2022    Pt will demo improved [facility] for daily living tasks by completing NHPT in 20 seconds or less on each side.   - added 10/31/2022    Pt will demo improved shoulder strength as evidence by ability to complete 15 reps of IR/ER at 15#.  - Progressing, cont goal, 11/21/2022      Patient Requires Follow-up:  [x]  Yes  []  No    Plan: [x] Continue per plan of care 2x/week for 4 weeks [] Alter current plan (see comments)   [] Plan of care initiated [] Hold pending MD visit [] Discharge         Timed Code Treatment Minutes:  53 minutes    Total Treatment Time:   53 minutes      Interactive communication between patient's primary therapist and covering therapist was performed in order to ensure accurate information regarding the patient's current condition, treatment and planned interventions as well as recent or anticipated changes in the plan of care.        Electronically signed by:    [place] TOLLIVER/REMINGTON MCKEON#[id number]

## 2022-11-23 ENCOUNTER — HOSPITAL ENCOUNTER (OUTPATIENT)
Dept: PHYSICAL THERAPY | Age: 32
Setting detail: THERAPIES SERIES
Discharge: HOME | End: 2022-11-23
Payer: COMMERCIAL

## 2022-11-23 ENCOUNTER — HOSPITAL ENCOUNTER (OUTPATIENT)
Dept: OCCUPATIONAL THERAPY | Age: 32
Setting detail: THERAPIES SERIES
Discharge: HOME | End: 2022-11-23
Payer: COMMERCIAL

## 2022-11-23 PROCEDURE — 97110 THERAPEUTIC EXERCISES: CPT

## 2022-11-23 PROCEDURE — 97112 NEUROMUSCULAR REEDUCATION: CPT

## 2022-11-23 PROCEDURE — 97530 THERAPEUTIC ACTIVITIES: CPT

## 2022-11-23 NOTE — FLOWSHEET NOTE
[facility]  [address] and Therapy  [facility]  Office: [phone]   Fax: [phone]    Occupational Therapy Daily Treatment Note    Date:  11/23/2022    Patient Name: Colin Mckeon      YOB: 1990        Medical Diagnosis: 514.152 Lesion at C2, 514.153A Lesion at C3, 514.154A Lesion at C4, 514.151A Lesion at C1     Treatment Diagnosis: Weakness in B UEs and impaired fine motor skills                 Referring Physician: Dr. Luisana Pratt                                                                Onset Date:  September 2021       Visits Allowed/Insurance/Certification Information:  [facility] approved for 16 visits through 11/26/2022      Precautions/ Contra-indications:  SCI, neck surgery with fusion C3-7     Plan of Care signed: Yes, 10/13/2022; 11/2/2022    Progress Note: [x]  Yes  []  No        Visit #: 5/8, 5/8    Pain Level:   1/10 arms and neck    SUBJECTIVE:  Pt arrived to session ambulating with SPC. OBJECTIVE:   Strength  Muscle  (*denotes pain) Right      Left          DATE 10/3/2022 10/31  11/23   10/3/2022  10/31  11/23   Shoulder Flexion  5  5    5 5      Shoulder Abduction  4+  5    5  5     Elbow Flexion 4+  5    5  5     Elbow Extension 5 5     5 5      Pronation 4-  4+    4-  4+     Supination 4-  4+    4-  4+     Wrist Flexion 4-  4+ 4+   4  4+ 4+    Wrist Extension 4-  4+ 5   4  4+  5   Comments:      Strength (Dynamometry) and Pinch Strength            In lbs.  Right   Left   Date 10/3/2022  10/31  11/23   10/3/2022  10/31  11/23    - Position Two 49.6  56  59.6   44.6  51.3  53                     Lateral Pinch 16.6  17  18   20.6  20.3  21.3   Three Point PInch 12.3  13.6  13.6   15.3  15.6  14.6                     Coordination                  9 Hole Peg Test 34  27  27   29  25  26   Comments:    Assessment of UE tone/spasticity:    Date: 10/27/22  R/L      Shoulder flexors 0/0      Internal rotators 0/0      External rotators 1+/0      Elbow flexors 0/0      Elbow extensor 1/0      Supinators 1/0      Pronators 0/1      Wrist flexors 0/0      Wrist extensors 0/0      Digit flexors 0/0      Digit extensors 1/0        Modified Radha Scale  0    No increase in muscle tone  1    Slight increase in muscle tone, manifested by a catch and release or by minimal        resistance at the end of the range of motion when the affected part(s) is moved in        flexion or extension  1+ Slight increase in muscle tone, manifested by a catch, followed by minimal        resistance throughout the remainder (less than half) of the ROM  2    More marked increase in muscle tone through most of the ROM, but        affected part(s) easily moved  3    Considerable increase in muscle tone, passive movement difficult  4    Affected part(s) rigid in flexion or extension       Exercises:    Exercises in bold performed in department today. Items not bolded are carried forward from prior visits for continuity of the record. Therapeutic Exercise/Neuromuscular Re-Education:   Exercise/Equipment  Resistance/Repetitions   Other comments   Theraputty exercises   , pinch, spreading out putty  x10 Pink  Reviewed for HEP   Wrist strengthening 5# hand weights (completed with right and left hand)  Wrist flex, 15x  Wrist ext, 15x  Supination/pronation, 15x  Radial deviation, 15x  Ulnar deviation, 15x      Seated at cables using 5#  Wrist ext, 15x each side  Wrist flex, 15x each side    FlexBar - Green  Frowns, 15 reps x2  Smiles, 15 reps x2  Twists, 12 reps x2 (both directions) Reviewed for HEP   Rotator cuff  Discussed strengthening rotators, wrist and forearms several times each week instead of once at the gym. Pt was given green theraband and was Instructed re: IR/ER exercises using the doorway frame in standing and in seated. Performed in standing IR and ER 10x each with each arm.       Reviewed these exercises with pt-he plans on adding these to his workout routine this week. Seated at cables  RUE  IR, 15x at 15#,  ER, 3x at 10#; 10x at 5#    LUE  IR, 15x at 15#  ER, 3x at 15#, 10x at 10#   Reviewed for HEP   In-hand manipulation/FMC Tissue crumble 10x each hand    Tear, crumble and flick  Tear paper towel into strips  Crumble each strip into balls  Flick balls, thumb and each digit    Purdue Pegboard  Place and remove pegs, spacers, washers  10 each hand  Used tweezers to remove   Reviewed for HEP   Pinch strengthening Black clothespins for 15 pom-poms  Lateral pinch, 1x each hand  3pt pinch, 1x each hand    Tea Tongs for 15 pom-poms  Hook grasp, 1x each hand     strengthening 35#    Right, 15 reps x3  Left, 15 reps x3      Stretches and self trigger point release Exercises  Upper Trapezius Stretch -   2 x daily - 7 x weekly - 20 sec hold    Self trigger point release with tennis ball -   7 x weekly - 60-90 sec hold Access Code: [email or web address]  URL: [employer]/  Date: 11/09/2022  Prepared by: Jennifer Ferreira      Provided info re: Thera-Cane for purchase if desired         ADL/Therapeutic Activities/Neuromuscular Re-Education:     Exercise/Equipment  Resistance/Repetitions   Other comments   Nuts and bolts x5    Thoracic ext Reviewed-tolerates 5 mins. At home daily  Supine on towel roll, 7 min   Added to HEP   To be completed daily for at least  min     Clothespins Pt placed clothespins of various colors and resistance from 1-8# onto vertical bars with his both his R and L hands using three-point pinch. Pinch/ strength Pt used a tea diffuser to grasp various size fabric balls and placed each in a plastic container to improve both  and pinch strength using each hand.            Manual Treatments:     Exercise/Equipment  Resistance/Repetitions   Other comments   Trigger point release To bilateral upper trap followed by STM \"It feels better\"               Modalities:     Mode/Equipment Resistance/Repetitions   Other comments                    Home Exercise Program:  see above       ASSESSMENT:   Pt demo steady progress toward OT goals, however goals not met. Pt met 0/2 STG and 0/4 LTG. Pt demo improvements in BUE MMT,  and pinch strengths as well as fine motor coordination. Pt familiar with and independent with HEP, therefore not further skilled OT services required at this time. Pt discharged from outpatient OT caseload this date. Pt in agreement with plan for d/c from OT caseload this date. Pt to continue with OT HEP. Recommended completing OT HEP a minimum of 4 days per week. Treatment/Activity Tolerance:   [x] Patient tolerated treatment well [] Patient limited by fatique  [] Patient limited by pain [] Patient limited by other medical complications  [] Other:     GOALS: Goals established 10/3/22   Patient stated goal: \"improve rotator cuffs, forearms, and hand muscles\". Therapist goals for Patient  Short Term Goals:  to be met in 2 weeks (by 11/14/2022)  Pt will report consistent compliance with HEP at least 4x - Progress made, however Goal Not Met, 11/23/2022    Pt will demo MMT for bilateral wrist and froearms to 5/5 for improved performance with daily living tasks. - Goal Partially Met (for wrist ext), 11/23/2022     Long Term Goals:  to be met in 4 weeks (by 11/28/2022)     Pt will increase bilaterally  strength to 60lbs to improve performance with daily living tasks that require power grasp. - Progress made, however Goal Not Met, 11/23/202     Pt will Increase bilateral pinch to 25 lbs to improve performance with fine motor tasks such as manipulating screws and bolts. - Progress made, however Goal Not Met, 11/23/2022    Pt will demo improved [address] for daily living tasks by completing NHPT in 20 seconds or less on each side.   - Progress made, however Goal Not Met, 11/23/2022    Pt will demo improved shoulder strength as evidence by ability to complete 15 reps of IR/ER at 15#.  - Progress made, however Goal Not Met, 11/23/2022      Patient Requires Follow-up:  []  Yes  [x]  No    Plan: [] Continue per plan of care 2x/week for 4 weeks [] Alter current plan (see comments)   [] Plan of care initiated [] Hold pending MD visit [x] Discharge         Timed Code Treatment Minutes:  41 minutes    Total Treatment Time:    41 minutes      Interactive communication between patient's primary therapist and covering therapist was performed in order to ensure accurate information regarding the patient's current condition, treatment and planned interventions as well as recent or anticipated changes in the plan of care.        Electronically signed by:    Jennifer TOLLIVER/REMINGTON #[id number]

## 2022-11-23 NOTE — PROGRESS NOTES
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 Wiliam Blackwood of care sent to provider:      [x]Faxed   []Co-signature    (attempts: 1[x]  10/27/22   2[] 3[])       Plan of care signed :  [x]  Yes  22    [] No  []  Cosign [x]  Fax    Date of Patient follow up with Physician: before end of year, pt will check     Is this a Progress Report:     [x]  Yes  []  No      If Yes:  Date Range for reporting period:  Beginning 10/27/22  Ending 22     Progress report will be due (10 Rx or 30 days whichever is less):       Recertification will be due (POC Duration  / 90 days whichever is less): 23 or 16 visit         Visit # Insurance Allowable Auth Required     1 visit until 10/3/22  New  approved for 2x/week x 8 weeks from 10/3/22 to 22 [x]  Yes []  No        Functional Scale/Score:    Measure Used:     LEFS  Score:      4780            Date Assessed:  11/3/22       Measure Used:     LEFS  Score:      4480            Date Assessed:  22      Latex Allergy:  [x]NO      []YES  Preferred Language for Healthcare:   [x]English       []other:    RESTRICTIONS/PRECAUTIONS:  SCI, neck surgery with fusion C3-7     SUBJECTIVE:    States no new complaints   Pt notes that his R LE is working better first thing in the morning for 2.5 hours before it starts to fatigue. Pt goes to gym 5 days per week, 2 hours per day. Pt states he is also doing HEP from PT.    Pt wants to try regular upright bike at gym. Patient goal for therapy:   \"to get back to work and get stronger with legs and walking\"      Pain level:  No pain today. At eval:    intermittent pressure lower cervical and into traps  Patient reports pain is  1/10 pain at present and  3/10 pain at its worst.    Plan Moving Forward/ For next visit:    Progress trunk strength and stability  Progress LE strength and flexibility  Balance, gait, steps as able        OBJECTIVE:      Exercises/Interventions:     Exercises in bold performed in department today. Items not bolded are carried forward from prior visits for continuity of the record.   Exercise/Equipment hold sets reps resistance Comments/cues HEP              THERAPEUTIC EXERCISE      []   Nustep seat 11 arms 11    Level 5 5 minutes, subjective info taken during ex     SLR supine  2 5  Cues for quad set, ankle DF and then lift, more difficult for pt [x]   Bridges with tband for glut med activation 5 3 10  Pt has red band at home, PT placed red band in cabinet with name [x]   Standing heel raises  2 15   [x]   Standing hip abduction B      Can step to R if unable to do kick  [x]   Seated ankle dorsiflexion on R LE  2 15 Red Theraband  [x]   Sidelying clamshells- added tband today to L leg only today, R leg without band  2 15 Red tband Cues to keep pelvis level [x]   Prone quadriceps stretch 30 1 3  3 repetitions on each side []   Modified plank 15  3  Cues for form   [x]     Attempted side plank modified      Unable to perform with good technique so did not complete             []             []   NEUROMUSCULAR RE-ED      []     Bird Dog (just did arms today due to poor form)   1 20   []     Bridges with legs on [employer]  2 20   []     Hamstring Curls on [employer]   1 20  Pt does hamstring curls at gym  []    Marching on [employer] with B UE support   2 15  Pt relying heavily on his arms for support []     LAQ with B UE support on [employer]  2 10  Pt relying heavily on his arms for support []     Toe Taps to the cone in // bars  1 10  BUE support and CGA from PT with gait belt  []    Reciprocal Toe Taps to the cone in // bars   1 10  BUE support and CGA from PT with gait belt []   Static stance balance on blue foam in // bars 30 2   UE hovering with SBA from PT with gait belt  []    Mini Lunges on blue foam in // bars     BUE support and CGA from PT with gait belt  []    Step ups   1 7  6\" step, BUEs    Sidestepping in // bars    1 lap  up and back    Step taps to 6\" step   1 15  BUES, R side very difficult    squats in // bars   1 10  BUEs    Stance with one foot on 6\" step and other on floor, done B  30 sec  2  No UEs     Standing balance activities firm surface     Feet apart, feet together, feet half tandem    Conditions 1-6  CGA                        Therapeutic Exercise/Home Exercise Program:   25 minutes ( 945- 1010)  HEP has been established, See above  pt to bring in his ex folder each visit     Discussed with pt that we want to do skilled therapy here in the dept and work on things he necessarily is not or cannot do in the gym by himself. Reassessed for progress note:   Measurements in bold below taken 11/23/22, non-bolded items taken from evaluation and for comparison purposes.          Movement Left MMT Right MMT   AROM        PROM   Hip Flexion 3-  3- 2  2       Hip Extension  2+   tested in prone 2+  Tested in prone         Hip Abduction 2+  2+ to 3-   Tested in SL 2+  2+  Tested in SL       Hip Adduction 4  2 in SL  (1st visit MMT not correct)     4  2+ in SL   (1st visit MMT not correct)       Hip Internal Rotation           Hip External Rotation 4   4-       Knee Flexion 5  4  Tested in prone 4-  3-   Tested in prone       Knee Extension 5  5 4  4+ in available range-tight HS)         Ankle Dorsiflexion 4+  5 2+  2+       Ankle Plantarflexion 2+  2+ 3-  3-       Ankle Inversion 4+  5 3-  3-       Ankle Eversion 4+  5 3-  3- Great toe extension              Therapeutic Activity:  0 minutes     Gait:  5 minutes: (6460-5984)  Gait with SPC: heels close, good step length B, slow nimo, discontinuous steps  Gait without SPC: heels apart, decreased step length on L, slower nimo  Steps: pt able to ascend with reciprocal pattern and 1 rail. Able to descend with reciprocal pattern and 1-2 rails. Gait: pt ambulates with SPC, slow nimo, decreased knee extension B, forward posture. He states that his right knee \"locks\" on him from time to time with walking. It did not do it initially after proximal stabilization exercises (Neuromuscular Re-Education) but then happened. Pt with decreased right ankle dorsiflexion with gait due to weakness noted       Neuromuscular Re-Education:  15 minutes  (2393-4789)  See chart above. Reassessed Tinetti (with use of SPC)  Tinetti re-assessed 11/23/22   Balance Score: 14  Gait Score: 7  Tinetti Total Score: 21       Canalith Repositioning Procedure:  0 minutes     Manual Therapy:  0 minutes    Modalities: 0 minutes        ASSESSMENT:  pt making progress with gait and balance. Tinetti score improved from 16/28 to 21/28. Some improvement noted with strength. Pt will benefit from continuing PT to maximize functional potential.     GOALS: Goals established 10/27/22     Patient stated goal:  \"to get back to work and get stronger with legs and walking\"  [x] Progressing: [] Met: [] Not Met: [] Adjusted    Therapist goals for Patient:   Short Term Goals: To be achieved in:  4 weeks   - Independent in HEP and progression per patient tolerance, in order to prevent re-injury. [] Progressing: [x] Met: [] Not Met: [] Adjusted  - Improve strength by 1/3 grade in weak areas to allow for proper functional mobility as indicated by patients Functional Deficits. [x] Progressing: [] Met: [] Not Met: [] Adjusted      Long Term Goals:  To be achieved in:  8 weeks   - LEFS score 60/80 or better to assist with reaching prior level of function. [] Progressing: [] Met: [] Not Met: [] Adjusted     [x] NOT APPLICABLE    - Patient will demonstrate increased AROM to [facility] to allow for proper joint functioning as indicated by patients Functional Deficits. [x] Progressing: [] Met: [] Not Met: [] Adjusted   - Improve strength by 2/3 grade or better in weak areas to allow for proper functional mobility as indicated by patients Functional Deficits. [] Progressing: [] Met: [] Not Met: [] Adjusted   - improve Tinetti score to 22/28 or better   [x] Progressing: [] Met: [] Not Met: [] Adjusted   - Stair goal: pt able to do flight of steps with reciprocal pattern and SPC with improved technique   [x] Progressing: [] Met: [] Not Met: [] Adjusted       Overall Progression Towards Functional goals/ Treatment Progress Update:  [x] Patient is progressing as expected towards functional goals listed. [] Progression is slowed due to complexities/Impairments listed. [] Progression has been slowed due to co-morbidities.   [] Plan just implemented, too soon to assess goals progression <30days   [] Goals require adjustment due to lack of progress  [] Patient is not progressing as expected and requires additional follow up with physician  [] Patient has met goals as marked above   [] Other    Prognosis for POC: [x] Good [] Fair  [] Poor    Patient requires continued skilled intervention: [x] Yes  [] No    Treatment/Activity Tolerance:  [x] Patient able to complete treatment  [] Patient limited by fatigue  [] Patient limited by pain    [] Patient limited by other medical complications  [] Other:         PLAN:   [x] Continue per plan of care [] Alter current plan (see comments above)  [] Plan of care initiated [] Hold pending MD visit [] Discharge          Therapeutic Exercise and NMR EXR  [x] (23321) Provided verbal/tactile cueing for activities related to strengthening, flexibility, endurance, ROM  for improvements in proximal strength and core control with self care, mobility, lifting and ambulation. [x] (26127) Provided verbal/tactile cueing for activities related to improving balance, coordination, kinesthetic sense, posture, motor skill, proprioception  to assist with core control in self care, mobility, lifting, and ambulation. Therapeutic Activities and Gait:    [] (13084 or 04070) Provided verbal/tactile cueing for activities related to improving balance, coordination, kinesthetic sense, posture, motor skill, proprioception and motor activation to allow for proper function  with self care and ADLs  [x] (41449) Provided training and instruction to the patient for proper core and proximal hip recruitment and positioning with ambulation re-education     Home Exercise Program:    [x] (17676) Reviewed/Progressed HEP activities related to strengthening, flexibility, endurance, ROM of core, proximal hip and LE for functional self-care, mobility, lifting and ambulation   [x] (72227) Reviewed/Progressed HEP activities related to improving balance, coordination, kinesthetic sense, posture, motor skill, proprioception of core, proximal hip and LE for self care, mobility, lifting, and ambulation      Manual Treatments:  PROM / STM / Oscillations-Mobs:  G-I, II, III, IV (PA's, Inf., Post.)  [] (43823) Provided manual therapy to mobilize soft tissue/joints for the purpose of modulating pain, promoting relaxation,  increasing ROM, reducing/eliminating soft tissue swelling/inflammation/restriction, improving soft tissue extensibility and allowing for proper ROM for normal function with self care, mobility, lifting and ambulation.      CRP:  [] (53877) Canalith Repositioning procedure for the assessment, treatment and education of BPPV    Modalities:   [] Ultrasound   [] Estim    [] Mechanical traction    [] Vaso-pneumatic device   [] Ionto     Charges:  Timed Code Treatment Minutes: 45   Total Treatment Minutes: 45     Medicare Cap total YTD:      [x]N/A [] $   Workers Comp Time Stamp      (Per CPT and Total Treatment)    Time In: 09:45 am   Time Out: 10:30 am       [] EVAL     [] Dry Needling  [x] JX(23101)   x  2   [] EStim Unattended 01437  [x] NMR (53582)  x  1  [] Estim Attended  20939  [] Manual (12874)  x      [] Mechanical Txn 38484  [] TA    x     [] Ultrasound  [] Gait   x                 [] Vaso  [] CRP    [] Ionto           [] Other:      Electronically signed by: Kelton Alvarez, PT, OMT-C, [id number]             Note: If patient does not return for scheduled/ recommended follow up visits, this note will serve as a discharge from care along with most recent update on progress.

## 2022-11-23 NOTE — PRE-CERTIFICATION NOTE
Extension of end date for PT to 12/30/22 -requested today @  [facility] - letter of approval will be faxed to us.

## 2022-11-23 NOTE — PROGRESS NOTES
[facility]  [facility] [address] [place], [facility]  Office: [phone]   Fax: [phone]    Occupational Therapy Discharge  Dear  Dr. Sanches August    The following patient has been discharged from therapy services. Please review the attached summary of the patient's plan of care, and verify that you agree with plan for discharge at this time    Plan of Care/Treatment to date:  [x] Therapeutic Exercise  []  Modalities:  [x] Therapeutic Activity   [] Ultrasound [] Electrical Stimulation   [] Total Motion Release   [] Fluidotherapy [] Kinesiotaping  []  Neuromuscular Re-education  [] Iontophoresis [] Coldpack/hotpack   [x]  Instruction in HEP    Other:  [x]  Manual Therapy     []   Dry needling             [] ADL/Self Care  [] IADL Training  [] LSVT BIG  [] Saebo  [] Splinting  [] Wheelchair Mobility                      Frequency/Duration:  # Days per week: [] 1 day # Weeks: [] 1 week [] 5 weeks      [x] 2 days   [] 2 weeks [] 6 weeks     [] 3 days   [] 3 weeks [] 7 weeks     [] 4 days   [x] 4 weeks [] 8 weeks     [] 5 days   [] 10 weeks [] 12 weeks        Rehab Potential: [] Excellent [x] Good [] Fair  [] Poor     Thank you for the referral of this patient.      Electronically signed by: Rehana Koroma OT, OTR/L      Occupational Therapy Discharge Note    Date:  11/23/2022    Patient Name: Ines Guajardo      YOB: 1990        Medical Diagnosis: 514.152 Lesion at C2, 514.153A Lesion at C3, 514.154A Lesion at C4, 514.151A Lesion at C1     Treatment Diagnosis: Weakness in B UEs and impaired fine motor skills                 Referring Physician: Dr. Sanches August                                                                Onset Date:  September 2021       Visits Allowed/Insurance/Certification Information:  [facility] approved for 16 visits through 11/26/2022      Precautions/ Contra-indications:  SCI, neck surgery with fusion C3-7     Plan of Care signed: Yes, 10/13/2022; 11/2/2022    Progress Note: [x]  Yes  []  No        Visit #: 5/8, 5/8    Pain Level:   1/10 arms and neck    SUBJECTIVE:  Pt arrived to session ambulating with SPC. OBJECTIVE:   Strength  Muscle  (*denotes pain) Right      Left          DATE 10/3/2022 10/31  11/23   10/3/2022  10/31  11/23   Shoulder Flexion  5  5    5 5      Shoulder Abduction  4+  5    5  5     Elbow Flexion 4+  5    5  5     Elbow Extension 5 5     5 5      Pronation 4-  4+    4-  4+     Supination 4-  4+    4-  4+     Wrist Flexion 4-  4+ 4+   4  4+ 4+    Wrist Extension 4-  4+ 5   4  4+  5   Comments:      Strength (Dynamometry) and Pinch Strength            In lbs. Right   Left   Date 10/3/2022  10/31  11/23   10/3/2022  10/31  11/23    - Position Two 49.6  56  59.6   44.6  51.3  53                     Lateral Pinch 16.6  17  18   20.6  20.3  21.3   Three Point PInch 12.3  13.6  13.6   15.3  15.6  14.6                     Coordination                  9 Hole Peg Test 34  27  27   29  25  26   Comments:         Exercises:    Exercises in bold performed in department today. Items not bolded are carried forward from prior visits for continuity of the record.     Therapeutic Exercise/Neuromuscular Re-Education:   Exercise/Equipment  Resistance/Repetitions   Other comments   Theraputty exercises   , pinch, spreading out putty  x10 Pink  Reviewed for HEP   Wrist strengthening 5# hand weights (completed with right and left hand)  Wrist flex, 15x  Wrist ext, 15x  Supination/pronation, 15x  Radial deviation, 15x  Ulnar deviation, 15x      FlexBar - Green  Frowns, 15 reps x2  Smiles, 15 reps x2  Twists, 12 reps x2 (both directions) Reviewed for HEP   Rotator cuff    Seated at cables  RUE  IR, 15x at 15#,  ER, 3x at 10#; 10x at 5#    LUE  IR, 15x at 15#  ER, 3x at 15#, 10x at 10#   Reviewed for HEP   In-hand manipulation/FMC Tissue crumble 10x each hand    Tear, crumble and flick  Tear paper towel into report consistent compliance with HEP at least 4x - Progress made, however Goal Not Met, 11/23/2022    Pt will demo MMT for bilateral wrist and froearms to 5/5 for improved performance with daily living tasks. - Goal Partially Met (for wrist ext), 11/23/2022     Long Term Goals:  to be met in 4 weeks (by 11/28/2022)     Pt will increase bilaterally  strength to 60lbs to improve performance with daily living tasks that require power grasp. - Progress made, however Goal Not Met, 11/23/202     Pt will Increase bilateral pinch to 25 lbs to improve performance with fine motor tasks such as manipulating screws and bolts. - Progress made, however Goal Not Met, 11/23/2022    Pt will demo improved [facility] for daily living tasks by completing NHPT in 20 seconds or less on each side.   - Progress made, however Goal Not Met, 11/23/2022    Pt will demo improved shoulder strength as evidence by ability to complete 15 reps of IR/ER at 15#.  - Progress made, however Goal Not Met, 11/23/2022      Patient Requires Follow-up:  []  Yes  [x]  No    Plan: [] Continue per plan of care 2x/week for 4 weeks [] Alter current plan (see comments)   [] Plan of care initiated [] Hold pending MD visit [x] Discharge           Electronically signed by:    Gregorio TOLLIVER/REMINGTON #[id number]

## 2022-11-29 ENCOUNTER — HOSPITAL ENCOUNTER (OUTPATIENT)
Dept: PHYSICAL THERAPY | Age: 32
Setting detail: THERAPIES SERIES
Discharge: HOME | End: 2022-11-29
Payer: COMMERCIAL

## 2022-11-29 PROCEDURE — 97112 NEUROMUSCULAR REEDUCATION: CPT

## 2022-11-29 NOTE — FLOWSHEET NOTE
Physical Therapy Daily Treatment Note    [x]Daily Treatment Note    []Progress Note    [] Discharge Summary          Date:  2022    Patient Name:  Elisa Jean-Baptiste  \"SXAGT\"  :  1990  MRN: [id number]      Medical/Treatment Diagnosis Information:  Diagnosis: O65.961P, S03.830T, Q07.614B  Treatment Diagnosis: LE weakness, abnormality of gait, imbalance    Insurance/Certification information:  PT Insurance Information: [facility] approved for 2x/week x 8 weeks (16 visits) from 10/3/22 to 22    Physician Information:  Referring Provider (secondary): Dr. Phyllis Slade of care sent to provider:      [x]Faxed   []Co-signature    (attempts: 1[x]  10/27/22   2[] 3[])       Plan of care signed :  [x]  Yes  22    [] No  []  Cosign [x]  Fax    Date of Patient follow up with Physician: before end of year, pt will check     Is this a Progress Report:     []  Yes  [x]  No      If Yes:  Date Range for reporting period:  Beginning 10/27/22  Ending 22     Progress report will be due (10 Rx or 30 days whichever is less):       Recertification will be due (POC Duration  / 90 days whichever is less): 23 or 16th visit         Visit # Insurance Allowable Auth Required     1 visit until 10/3/22  New C9 approved for 2x/week x 8 weeks from 10/3/22 to 22  C9 extended until 22 [x]  Yes []  No        Functional Scale/Score:    Measure Used:     LEFS  Score:      4780            Date Assessed:  11/3/22       Measure Used:     LEFS  Score:      4480            Date Assessed:  22      Latex Allergy:  [x]NO      []YES  Preferred Language for Healthcare:   [x]English       []other:    RESTRICTIONS/PRECAUTIONS:  SCI, neck surgery with fusion C3-7     SUBJECTIVE:    States no new complaints      Pt goes to gym 5 days per week, 2 hours per day. Pt states he is also doing HEP from PT. Pt wants to try regular upright bike at gym.           Patient goal for therapy:   \"to get back to work and get stronger with legs and walking\"      Pain level:  mild neck pull, 1/10   At eval:    intermittent pressure lower cervical and into traps  Patient reports pain is  1/10 pain at present and  3/10 pain at its worst.    Plan Moving Forward/ For next visit:    Progress trunk strength and stability  Progress LE strength and flexibility  Balance, gait, steps as able        OBJECTIVE:      Exercises/Interventions:     Exercises in bold performed in department today. Items not bolded are carried forward from prior visits for continuity of the record.   Exercise/Equipment hold sets reps resistance Comments/cues HEP              THERAPEUTIC EXERCISE      []   Nustep seat 11 arms 11    Level 5 5 minutes, subjective info taken during ex     Elliptical     Level 1 2.5 minutes,  Stationary handles, on/off with LLE, CGA, pt fatigued quickly with RLE     SLR supine  2 5  Cues for quad set, ankle DF and then lift, more difficult for pt [x]   Bridges with tband for glut med activation 5 3 10  Pt has red band at home, PT placed red band in cabinet with name [x]   Standing heel raises  2 15   [x]   Standing hip abduction B      Can step to R if unable to do kick  [x]   Seated ankle dorsiflexion on R LE  2 15 Red Theraband  [x]   Sidelying clamshells- added tband today to L leg only today, R leg without band  2 15 Red tband Cues to keep pelvis level [x]   Prone quadriceps stretch 30 1 3  3 repetitions on each side []   Modified plank 15  3  Cues for form   [x]     Attempted side plank modified      Unable to perform with good technique so did not complete             []             []   NEUROMUSCULAR RE-ED      []     Bird Dog (just did arms today due to poor form)   1 20   []     Bridges with legs on [employer]  2 20   []     Hamstring Curls on [employer]   1 20  Pt does hamstring curls at gym  []    Marching on [employer] with B UE support   2 15  Pt relying heavily on his arms for support [] LAQ with B UE support on [employer]  2 10  Pt relying heavily on his arms for support []     Toe Taps to the cone in // bars  1 10  BUE support and CGA from PT with gait belt  []    Reciprocal Toe Taps to the cone in // bars   1 10  BUE support and CGA from PT with gait belt []   Static stance balance on blue foam in // bars 30 2   UE hovering with SBA from PT with gait belt  []    Mini Lunges  1 10  BUE support and CGA from PT with gait belt  []    Step ups   1 10  6\" step, single UE with LLE, BUEs with RLE    Sidestepping in // bars    2 lap  up and back, BUEs, cues for glut activation on stance leg     Step taps to 6\" step   1 15  BUES, R side very difficult    squats in // bars   2 10  BUEs, cues for technique    Stance with one foot on 6\" step and other on floor, done B  30 sec  2  No UEs     Standing balance activities firm surface     Feet apart, feet together, feet half tandem    Conditions 1-6  CGA    Standing hip abduction off 2\" step   2 10  BUES, cues to keep pelvis level and glut activation on stance leg               Therapeutic Exercise/Home Exercise Program:   5 minutes ( [id number])  HEP has been established, See above  pt to bring in his ex folder each visit     Discussed with pt that we want to do skilled therapy here in the dept and work on things he necessarily is not or cannot do in the gym by himself. Reassessed for progress note:   Measurements in bold below taken 11/23/22, non-bolded items taken from evaluation and for comparison purposes.          Movement Left MMT Right MMT   AROM        PROM   Hip Flexion 3-  3- 2  2       Hip Extension  2+   tested in prone 2+  Tested in prone         Hip Abduction 2+  2+ to 3-   Tested in SL 2+  2+  Tested in SL       Hip Adduction 4  2 in SL  (1st visit MMT not correct)     4  2+ in SL   (1st visit MMT not correct)       Hip Internal Rotation           Hip External Rotation 4   4-       Knee Flexion 5  4  Tested in prone 4-  3-   Tested in prone       Knee Extension 5  5 4  4+ in available range-tight HS)         Ankle Dorsiflexion 4+  5 2+  2+       Ankle Plantarflexion 2+  2+ 3-  3-       Ankle Inversion 4+  5 3-  3-       Ankle Eversion 4+  5 3-  3-       Great toe extension              Therapeutic Activity:  0 minutes     Gait:  0 minutes: ( )  Gait with SPC: heels close, good step length B, slow nimo, discontinuous steps  Gait without SPC: heels apart, decreased step length on L, slower nimo  Steps: pt able to ascend with reciprocal pattern and 1 rail. Able to descend with reciprocal pattern and 1-2 rails. Gait: pt ambulates with SPC, slow nimo, decreased knee extension B, forward posture. He states that his right knee \"locks\" on him from time to time with walking. It did not do it initially after proximal stabilization exercises (Neuromuscular Re-Education) but then happened. Pt with decreased right ankle dorsiflexion with gait due to weakness noted       Neuromuscular Re-Education:  45 minutes  (1040--1125)  See chart above. Canalith Repositioning Procedure:  0 minutes     Manual Therapy:  0 minutes    Modalities: 0 minutes        ASSESSMENT:      GOALS: Goals established 10/27/22     Patient stated goal:  \"to get back to work and get stronger with legs and walking\"  [x] Progressing: [] Met: [] Not Met: [] Adjusted    Therapist goals for Patient:   Short Term Goals: To be achieved in:  4 weeks   - Independent in HEP and progression per patient tolerance, in order to prevent re-injury. [] Progressing: [x] Met: [] Not Met: [] Adjusted  - Improve strength by 1/3 grade in weak areas to allow for proper functional mobility as indicated by patients Functional Deficits. [x] Progressing: [] Met: [] Not Met: [] Adjusted      Long Term Goals: To be achieved in:  8 weeks   - LEFS score 60/80 or better to assist with reaching prior level of function.     [] Progressing: [] Met: [] Not Met: [] Adjusted     [x] NOT APPLICABLE    - Patient will demonstrate increased AROM to [facility] to allow for proper joint functioning as indicated by patients Functional Deficits. [x] Progressing: [] Met: [] Not Met: [] Adjusted   - Improve strength by 2/3 grade or better in weak areas to allow for proper functional mobility as indicated by patients Functional Deficits. [] Progressing: [] Met: [] Not Met: [] Adjusted   - improve Tinetti score to 22/28 or better   [x] Progressing: [] Met: [] Not Met: [] Adjusted   - Stair goal: pt able to do flight of steps with reciprocal pattern and SPC with improved technique   [x] Progressing: [] Met: [] Not Met: [] Adjusted       Overall Progression Towards Functional goals/ Treatment Progress Update:  [x] Patient is progressing as expected towards functional goals listed. [] Progression is slowed due to complexities/Impairments listed. [] Progression has been slowed due to co-morbidities. [] Plan just implemented, too soon to assess goals progression <30days   [] Goals require adjustment due to lack of progress  [] Patient is not progressing as expected and requires additional follow up with physician  [] Patient has met goals as marked above   [] Other    Prognosis for POC: [x] Good [] Fair  [] Poor    Patient requires continued skilled intervention: [x] Yes  [] No    Treatment/Activity Tolerance:  [x] Patient able to complete treatment  [] Patient limited by fatigue  [] Patient limited by pain    [] Patient limited by other medical complications  [] Other:         PLAN:   [x] Continue per plan of care [] Alter current plan (see comments above)  [] Plan of care initiated [] Hold pending MD visit [] Discharge          Therapeutic Exercise and NMR EXR  [x] (17149) Provided verbal/tactile cueing for activities related to strengthening, flexibility, endurance, ROM  for improvements in proximal strength and core control with self care, mobility, lifting and ambulation.   [x] (86499) Provided verbal/tactile cueing for activities related to improving balance, coordination, kinesthetic sense, posture, motor skill, proprioception  to assist with core control in self care, mobility, lifting, and ambulation. Therapeutic Activities and Gait:    [] (24434 or 43291) Provided verbal/tactile cueing for activities related to improving balance, coordination, kinesthetic sense, posture, motor skill, proprioception and motor activation to allow for proper function  with self care and ADLs  [] (32230) Provided training and instruction to the patient for proper core and proximal hip recruitment and positioning with ambulation re-education     Home Exercise Program:    [x] (93214) Reviewed/Progressed HEP activities related to strengthening, flexibility, endurance, ROM of core, proximal hip and LE for functional self-care, mobility, lifting and ambulation   [x] (67593) Reviewed/Progressed HEP activities related to improving balance, coordination, kinesthetic sense, posture, motor skill, proprioception of core, proximal hip and LE for self care, mobility, lifting, and ambulation      Manual Treatments:  PROM / STM / Oscillations-Mobs:  G-I, II, III, IV (PA's, Inf., Post.)  [] (64018) Provided manual therapy to mobilize soft tissue/joints for the purpose of modulating pain, promoting relaxation,  increasing ROM, reducing/eliminating soft tissue swelling/inflammation/restriction, improving soft tissue extensibility and allowing for proper ROM for normal function with self care, mobility, lifting and ambulation.      CRP:  [] (57628) Canalith Repositioning procedure for the assessment, treatment and education of BPPV    Modalities:   [] Ultrasound   [] Estim    [] Mechanical traction    [] Vaso-pneumatic device   [] Ionto     Charges:  Timed Code Treatment Minutes: 50   Total Treatment Minutes: 50     Medicare Cap total YTD:      [x]N/A      [] $   Workers Comp Time Stamp      (Per CPT and Total Treatment)    Time In: 1195 am   Time Out: 0889 am       [] EVAL     [] Dry Needling  [] MY(26847)   x    [] EStim Unattended 42915  [x] NMR (67805)  x  3  [] Estim Attended  20546  [] Manual (99272)  x      [] Mechanical Txn 19620  [] TA    x     [] Ultrasound  [] Gait   x                 [] Vaso  [] CRP    [] Ionto           [] Other:      Electronically signed by: Josette Shay PT, OMT-C, [id number]             Note: If patient does not return for scheduled/ recommended follow up visits, this note will serve as a discharge from care along with most recent update on progress.

## 2022-12-07 ENCOUNTER — HOSPITAL ENCOUNTER (OUTPATIENT)
Dept: PHYSICAL THERAPY | Age: 32
Setting detail: THERAPIES SERIES
Discharge: HOME | End: 2022-12-07
Payer: COMMERCIAL

## 2022-12-07 PROCEDURE — 97110 THERAPEUTIC EXERCISES: CPT

## 2022-12-07 PROCEDURE — 97140 MANUAL THERAPY 1/> REGIONS: CPT

## 2022-12-07 PROCEDURE — 97112 NEUROMUSCULAR REEDUCATION: CPT

## 2022-12-07 NOTE — FLOWSHEET NOTE
Physical Therapy Daily Treatment Note    [x]Daily Treatment Note    []Progress Note    [] Discharge Summary          Date:  2022    Patient Name:  Silas Subramanian  \"MBXCE\"  :  1990  MRN: [id number]      Medical/Treatment Diagnosis Information:  Diagnosis: V60.654V, O79.845E, S14.153A  Treatment Diagnosis: LE weakness, abnormality of gait, imbalance    Insurance/Certification information:  PT Insurance Information: [facility] approved for 2x/week x 8 weeks (16 visits) from 10/3/22 to 22    Physician Information:  Referring Provider (secondary): Dr. Mono Colbert of care sent to provider:      [x]Faxed   []Co-signature    (attempts: 1[x]  10/27/22   2[] 3[])       Plan of care signed :  [x]  Yes  22    [] No  []  Cosign [x]  Fax    Date of Patient follow up with Physician: before end of year, pt will check     Is this a Progress Report:     []  Yes  [x]  No      If Yes:  Date Range for reporting period:  Beginning 10/27/22  PN: 22     Progress report will be due (10 Rx or 30 days whichever is less):       Recertification will be due (POC Duration  / 90 days whichever is less): 23 or 16th visit         Visit # Insurance Allowable Auth Required   10/17  1 visit until 10/3/22  New C9 approved for 2x/week x 8 weeks from 10/3/22 to 22  C9 extended until 22 [x]  Yes []  No        Functional Scale/Score:    Measure Used:     LEFS  Score:      4780            Date Assessed:  11/3/22       Measure Used:     LEFS  Score:      4480            Date Assessed:  22      Latex Allergy:  [x]NO      []YES  Preferred Language for Healthcare:   [x]English       []other:    RESTRICTIONS/PRECAUTIONS:  SCI, neck surgery with fusion C3-7     SUBJECTIVE:    States that his back has been bothering him the past few days. Still going to the gym daily.           Patient goal for therapy:   \"to get back to work and get stronger with legs and walking\"      Pain level:  mild neck pull, 1/10   At eval:    intermittent pressure lower cervical and into traps  Patient reports pain is  1/10 pain at present and  3/10 pain at its worst.    Plan Moving Forward/ For next visit:    Progress trunk strength and stability  Progress LE strength and flexibility  Balance, gait, steps as able        OBJECTIVE:      Exercises/Interventions:     Exercises in bold performed in department today. Items not bolded are carried forward from prior visits for continuity of the record.   Exercise/Equipment hold sets reps resistance Comments/cues HEP              THERAPEUTIC EXERCISE      []   Nustep seat 11 arms 11    Level 5 5 minutes, subjective info taken during ex     Elliptical     Level 1 3 minutes fwd, 3 min bwd,  Stationary handles, on/off with LLE, CGA, pt fatigued quickly with RLE     SLR supine  2 5  Cues for quad set, ankle DF and then lift, more difficult for pt [x]   Bridges with tband for glut med activation 5 3 10  Pt has red band at home, PT placed red band in cabinet with name [x]   Standing heel raises  2 15   [x]   Standing hip abduction B      Can step to R if unable to do kick  [x]   Seated ankle dorsiflexion on R LE  2 15 Red Theraband  [x]   Sidelying clamshells- added tband today to L leg only today, R leg without band  2 15 Red tband Cues to keep pelvis level [x]   Prone quadriceps stretch 30 1 3  3 repetitions on each side []   Modified plank 15  3  Cues for form   [x]     Attempted side plank modified      Unable to perform with good technique so did not complete             []             []   NEUROMUSCULAR RE-ED      []     Bird Dog (just did arms today due to poor form)   1 20   []     Bridges with legs on [employer]  2 20   []     Hamstring Curls on [employer]   1 20  Pt does hamstring curls at gym  []    Marching on [employer] with B UE support   2 15  Pt relying heavily on his arms for support []     LAQ with B UE support on [employer]  2 10  Pt relying heavily on his arms for support []     Toe Taps to the cone in // bars  1 10  BUE support and CGA from PT with gait belt  []    Reciprocal Toe Taps to the cone in // bars   1 10  BUE support and CGA from PT with gait belt []   Static stance balance on blue foam in // bars 30 2   UE hovering with SBA from PT with gait belt  []    Mini Lunges  1 10  BUE support and CGA from PT with gait belt  []    Step ups   1 10  6\" step, single UE with LLE, BUEs with RLE    Sidestepping in // bars    2 lap  up and back, BUEs, cues for glut activation on stance leg     Step taps to 6\" step   1 15  BUES, R side very difficult    squats in // bars   2 10  BUEs, cues for technique    Stance with one foot on 6\" step and other on floor, done B  30 sec  2  No UEs     Standing balance activities firm surface     Feet apart, feet together, feet half tandem    Conditions 1-6  CGA    Standing hip abduction off 2\" step   2 10  BUES, cues to keep pelvis level and glut activation on stance leg    1 foot ground + 1 foot airex  -balance  -chest pass  -OH lift  -rot taps   30\" B   1  1  1  1     10 B  10 B  10 B                 Therapeutic Exercise/Home Exercise Program:   10 minutes ( 3750-1985)  Warm up on elliptical + rest break    HEP has been established, See above  pt to bring in his ex folder each visit     Discussed with pt that we want to do skilled therapy here in the dept and work on things he necessarily is not or cannot do in the gym by himself. Reassessed for progress note:   Measurements in bold below taken 11/23/22, non-bolded items taken from evaluation and for comparison purposes.          Movement Left MMT Right MMT   AROM        PROM   Hip Flexion 3-  3- 2  2       Hip Extension  2+   tested in prone 2+  Tested in prone         Hip Abduction 2+  2+ to 3-   Tested in SL 2+  2+  Tested in SL       Hip Adduction 4  2 in SL  (1st visit MMT not correct)     4  2+ in SL   (1st visit MMT not correct)       Hip Internal Rotation Hip External Rotation 4   4-       Knee Flexion 5  4  Tested in prone 4-  3-   Tested in prone       Knee Extension 5  5 4  4+ in available range-tight HS)         Ankle Dorsiflexion 4+  5 2+  2+       Ankle Plantarflexion 2+  2+ 3-  3-       Ankle Inversion 4+  5 3-  3-       Ankle Eversion 4+  5 3-  3-       Great toe extension              Therapeutic Activity:  0 minutes     Gait:  0 minutes: ( )  Gait with SPC: heels close, good step length B, slow nimo, discontinuous steps  Gait without SPC: heels apart, decreased step length on L, slower nimo  Steps: pt able to ascend with reciprocal pattern and 1 rail. Able to descend with reciprocal pattern and 1-2 rails. Gait: pt ambulates with SPC, slow nimo, decreased knee extension B, forward posture. He states that his right knee \"locks\" on him from time to time with walking. It did not do it initially after proximal stabilization exercises (Neuromuscular Re-Education) but then happened. Pt with decreased right ankle dorsiflexion with gait due to weakness noted       Neuromuscular Re-Education:  10 minutes  (1110--1120)  balance      Canalith Repositioning Procedure:  0 minutes     Manual Therapy:  20 minutes (10:40-11:00)  Ball belt traction  Manual ball DKTC and LTR      Modalities: 0 minutes        ASSESSMENT:  Performed manual stretching to low back musculature with ball belt traction since per Pt requesting back stretch d/t tightness. [place] good stretch across low back and more on R side with ball belt traction and manual DKTC and LTR. Able to complete 3 min fwd and 3 min bwd on elliptical; more difficulty with fwd, however able to achieve TKE on RLE with bwd. Continue to work on balance and strength in RLE in order to improve overall functional mobility.        GOALS: Goals established 10/27/22     Patient stated goal:  \"to get back to work and get stronger with legs and walking\"  [x] Progressing: [] Met: [] Not Met: [] Adjusted    Therapist goals for Patient:   Short Term Goals: To be achieved in:  4 weeks   - Independent in HEP and progression per patient tolerance, in order to prevent re-injury. [] Progressing: [x] Met: [] Not Met: [] Adjusted  - Improve strength by 1/3 grade in weak areas to allow for proper functional mobility as indicated by patients Functional Deficits. [x] Progressing: [] Met: [] Not Met: [] Adjusted      Long Term Goals: To be achieved in:  8 weeks   - LEFS score 60/80 or better to assist with reaching prior level of function. [] Progressing: [] Met: [] Not Met: [] Adjusted     [x] NOT APPLICABLE    - Patient will demonstrate increased AROM to [facility] to allow for proper joint functioning as indicated by patients Functional Deficits. [x] Progressing: [] Met: [] Not Met: [] Adjusted   - Improve strength by 2/3 grade or better in weak areas to allow for proper functional mobility as indicated by patients Functional Deficits. [] Progressing: [] Met: [] Not Met: [] Adjusted   - improve Tinetti score to 22/28 or better   [x] Progressing: [] Met: [] Not Met: [] Adjusted   - Stair goal: pt able to do flight of steps with reciprocal pattern and SPC with improved technique   [x] Progressing: [] Met: [] Not Met: [] Adjusted       Overall Progression Towards Functional goals/ Treatment Progress Update:  [x] Patient is progressing as expected towards functional goals listed. [] Progression is slowed due to complexities/Impairments listed. [] Progression has been slowed due to co-morbidities.   [] Plan just implemented, too soon to assess goals progression <30days   [] Goals require adjustment due to lack of progress  [] Patient is not progressing as expected and requires additional follow up with physician  [] Patient has met goals as marked above   [] Other    Prognosis for POC: [x] Good [] Fair  [] Poor    Patient requires continued skilled intervention: [x] Yes  [] No    Treatment/Activity Tolerance:  [x] Patient able to complete treatment  [] Patient limited by fatigue  [] Patient limited by pain    [] Patient limited by other medical complications  [] Other:         PLAN:   [x] Continue per plan of care [] Alter current plan (see comments above)  [] Plan of care initiated [] Hold pending MD visit [] Discharge          Therapeutic Exercise and NMR EXR  [x] (60358) Provided verbal/tactile cueing for activities related to strengthening, flexibility, endurance, ROM  for improvements in proximal strength and core control with self care, mobility, lifting and ambulation. [x] (91443) Provided verbal/tactile cueing for activities related to improving balance, coordination, kinesthetic sense, posture, motor skill, proprioception  to assist with core control in self care, mobility, lifting, and ambulation.      Therapeutic Activities and Gait:    [] (86765 or 56669) Provided verbal/tactile cueing for activities related to improving balance, coordination, kinesthetic sense, posture, motor skill, proprioception and motor activation to allow for proper function  with self care and ADLs  [] (48115) Provided training and instruction to the patient for proper core and proximal hip recruitment and positioning with ambulation re-education     Home Exercise Program:    [] (14791) Reviewed/Progressed HEP activities related to strengthening, flexibility, endurance, ROM of core, proximal hip and LE for functional self-care, mobility, lifting and ambulation   [] (75089) Reviewed/Progressed HEP activities related to improving balance, coordination, kinesthetic sense, posture, motor skill, proprioception of core, proximal hip and LE for self care, mobility, lifting, and ambulation      Manual Treatments:  PROM / STM / Oscillations-Mobs:  G-I, II, III, IV (PA's, Inf., Post.)  [x] (40942) Provided manual therapy to mobilize soft tissue/joints for the purpose of modulating pain, promoting relaxation,  increasing ROM, reducing/eliminating soft tissue swelling/inflammation/restriction, improving soft tissue extensibility and allowing for proper ROM for normal function with self care, mobility, lifting and ambulation. CRP:  [] (99429) Canalith Repositioning procedure for the assessment, treatment and education of BPPV    Modalities:   [] Ultrasound   [] Estim    [] Mechanical traction    [] Vaso-pneumatic device   [] Ionto     Charges:  Timed Code Treatment Minutes: 40   Total Treatment Minutes: 40     Medicare Cap total YTD:      [x]N/A      [] $   Workers Comp Time Stamp      (Per CPT and Total Treatment)    Time In: 6999 am   Time Out: 1120 am       [] EVAL     [] Dry Needling  [x] OD(61535)   x  1  [] EStim Unattended 94785  [x] NMR (24561)  x  1  [] Estim Attended  27529  [x] Manual (93645)  x  1  [] Mechanical Txn 18102  [] TA    x     [] Ultrasound  [] Gait   x                 [] Vaso  [] CRP    [] Ionto           [] Other:      Electronically signed by: Francoise Prater PT, DPT     Note: If patient does not return for scheduled/ recommended follow up visits, this note will serve as a discharge from care along with most recent update on progress.

## 2022-12-13 ENCOUNTER — HOSPITAL ENCOUNTER (OUTPATIENT)
Dept: PHYSICAL THERAPY | Age: 32
Setting detail: THERAPIES SERIES
Discharge: HOME | End: 2022-12-13
Payer: COMMERCIAL

## 2022-12-13 PROCEDURE — 97110 THERAPEUTIC EXERCISES: CPT

## 2022-12-13 PROCEDURE — 97112 NEUROMUSCULAR REEDUCATION: CPT

## 2022-12-13 NOTE — FLOWSHEET NOTE
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 Donnis Leventhal of care sent to provider:      [x]Faxed   []Co-signature    (attempts: 1[x]  10/27/22   2[] 3[])       Plan of care signed :  [x]  Yes  22    [] No  []  Cosign [x]  Fax    Date of Patient follow up with Physician: before end of year, pt will check     Is this a Progress Report:     []  Yes  [x]  No      If Yes:  Date Range for reporting period:  Beginning 10/27/22  PN: 22     Progress report will be due (10 Rx or 30 days whichever is less):       Recertification will be due (POC Duration  / 90 days whichever is less): 23 or 16th visit         Visit # Insurance Allowable Auth Required     1 visit until 10/3/22  New C9 approved for 2x/week x 8 weeks from 10/3/22 to 22  C9 extended until 22 [x]  Yes []  No        Functional Scale/Score:    Measure Used:     LEFS  Score:      47/80            Date Assessed:  11/3/22       Measure Used:     LEFS  Score:      44/80            Date Assessed:  22      Latex Allergy:  [x]NO      []YES  Preferred Language for Healthcare:   [x]English       []other:    RESTRICTIONS/PRECAUTIONS:  SCI, neck surgery with fusion C3-7     SUBJECTIVE:    No new complaints  Doing HEP and going to gym 5 days per week.   Pt asks about stretching achilles tendon, asks about how to decompress spine, and how to find a licensed medical massage therapist   States  has approved medical massage but he hasn't found a provider yet         Patient goal for therapy:   \"to get back to work and get stronger with legs and walking\"      Pain level:  no complaints of pain   At eval:    intermittent pressure lower cervical and into traps  Patient reports pain is  1/10 pain at present and  3/10 pain at its worst.    Plan Moving Forward/ For next visit:    Progress trunk strength and stability  Progress LE strength and flexibility  Balance, gait, steps as able        OBJECTIVE:      Exercises/Interventions:     Exercises in bold performed in department today. Items not bolded are carried forward from prior visits for continuity of the record.   Exercise/Equipment hold sets reps resistance Comments/cues HEP              THERAPEUTIC EXERCISE      []   Nustep seat 11 arms 11    Level 6 11 minutes, subjective info taken during ex x 5 min     Elliptical     Level 1 3 minutes fwd, 3 min bwd,  Stationary handles, on/off with LLE, CGA, pt fatigued quickly with RLE     SLR supine  2 5  Cues for quad set, ankle DF and then lift, more difficult for pt [x]   Bridges with tband for glut med activation 5 3 10  Pt has red band at home, PT placed red band in cabinet with name [x]   Standing heel raises  2 15   [x]   Standing hip abduction B      Can step to R if unable to do kick  [x]   Seated ankle dorsiflexion on R LE  2 15 Red Theraband  [x]   Sidelying clamshells- added tband today to L leg only today, R leg without band  2 15 Red tband Cues to keep pelvis level [x]   Prone quadriceps stretch 30 1 3  3 repetitions on each side []   Modified plank 15  3  Cues for form   [x]     Attempted side plank modified      Unable to perform with good technique so did not complete       Standing gastroc stretch  30  3   [x]      Seated hamstring stretch   30  3   [x]   NEUROMUSCULAR RE-ED      []     Bird Dog (just did arms today due to poor form)   1 20   []     Bridges with legs on [employer]  2 20   [] Hamstring Curls on [employer]   1 20  Pt does hamstring curls at gym  []    Marching on [employer] with B UE support   2 15  Pt relying heavily on his arms for support []     LAQ with B UE support on [employer]  2 10  Pt relying heavily on his arms for support []     Toe Taps to the cone in // bars  1 10  BUE support and CGA from PT with gait belt  []    Reciprocal Toe Taps to the cone in // bars   1 10  BUE support and CGA from PT with gait belt []   Static stance balance on blue foam in // bars 30 2   UE hovering with SBA from PT with gait belt  []    Mini Lunges  1 10  BUE support and CGA from PT with gait belt  []    Step ups   1 10  6\" step, single UE with both sides    Lateral step ups and overs   1 10  6\" step, BUEs     Sidestepping in // bars    2 lap  up and back, BUEs, cues for glut activation on stance leg     Lateral step downs with heel tap      2\" with BUEs, cues for control of stance knee and avoiding hyperextension      Step taps to 6\" step   1 15  BUES, R side very difficult    squats in // bars   2 10  BUEs, cues for technique    Stance with one foot on 6\" step and other on floor, done B  30 sec  2  No UEs     Standing balance activities firm surface     Feet apart, feet together, feet half tandem    Conditions 1-6  CGA    Standing balance activities foam surface      Feet together and half tandem, conditions 1-6  CGA    Standing hip abduction off 2\" step   2 10  BUES, cues to keep pelvis level and glut activation on stance leg    1 foot ground + 1 foot airex  -balance  -chest pass  -OH lift  -rot taps   30\" B   1  2  2  2     10 B  10 B  10 B      One set with L foot on airex and one set with R                Therapeutic Exercise/Home Exercise Program:   30 minutes ( [id number])  HEP has been established, See above, reviewed, progressed, pt given handouts of new exs   Discussed massage therapy, PT suggested finding a chiropractic office since they usually have licensed massage therapists and may be able to accept his [facility] as payment   Discussed spinal decompression and what purpose pt is trying to achieve. Pt inst NOT to do any traction or decompression on his neck. Pt states he just wants to stretch out his back. PT explained that we cannot provide traction to his back, as we are not treating him for a back diagnosis under [facility]. Briefly discussed inversion table may be able to give what pt is wanting but pt should check with his MD first.   pt to bring in his ex folder each visit     Discussed with pt that we want to do skilled therapy here in the dept and work on things he necessarily is not or cannot do in the gym by himself. Reassessed for progress note:   Measurements in bold below taken 11/23/22, non-bolded items taken from evaluation and for comparison purposes. Movement Left MMT Right MMT   AROM        PROM   Hip Flexion 3-  3- 2  2       Hip Extension  2+   tested in prone 2+  Tested in prone         Hip Abduction 2+  2+ to 3-   Tested in SL 2+  2+  Tested in SL       Hip Adduction 4  2 in SL  (1st visit MMT not correct)     4  2+ in SL   (1st visit MMT not correct)       Hip Internal Rotation           Hip External Rotation 4   4-       Knee Flexion 5  4  Tested in prone 4-  3-   Tested in prone       Knee Extension 5  5 4  4+ in available range-tight HS)         Ankle Dorsiflexion 4+  5 2+  2+       Ankle Plantarflexion 2+  2+ 3-  3-       Ankle Inversion 4+  5 3-  3-       Ankle Eversion 4+  5 3-  3-       Great toe extension              Therapeutic Activity:  0 minutes     Gait:  0 minutes: ( )  Gait with SPC: heels close, good step length B, slow nimo, discontinuous steps  Gait without SPC: heels apart, decreased step length on L, slower nimo  Steps: pt able to ascend with reciprocal pattern and 1 rail. Able to descend with reciprocal pattern and 1-2 rails. Gait: pt ambulates with SPC, slow nimo, decreased knee extension B, forward posture.  He states that his right knee \"locks\" on him from time to time with walking. It did not do it initially after proximal stabilization exercises (Neuromuscular Re-Education) but then happened. Pt with decreased right ankle dorsiflexion with gait due to weakness noted       Neuromuscular Re-Education:  35 minutes  (1110--1145)  See above       Canalith Repositioning Procedure:  0 minutes     Manual Therapy:        Modalities: 0 minutes        ASSESSMENT:        GOALS: Goals established 10/27/22     Patient stated goal:  \"to get back to work and get stronger with legs and walking\"  [x] Progressing: [] Met: [] Not Met: [] Adjusted    Therapist goals for Patient:   Short Term Goals: To be achieved in:  4 weeks   - Independent in HEP and progression per patient tolerance, in order to prevent re-injury. [] Progressing: [x] Met: [] Not Met: [] Adjusted  - Improve strength by 1/3 grade in weak areas to allow for proper functional mobility as indicated by patients Functional Deficits. [x] Progressing: [] Met: [] Not Met: [] Adjusted      Long Term Goals: To be achieved in:  8 weeks   - LEFS score 60/80 or better to assist with reaching prior level of function. [] Progressing: [] Met: [] Not Met: [] Adjusted     [x] NOT APPLICABLE    - Patient will demonstrate increased AROM to [facility] to allow for proper joint functioning as indicated by patients Functional Deficits. [x] Progressing: [] Met: [] Not Met: [] Adjusted   - Improve strength by 2/3 grade or better in weak areas to allow for proper functional mobility as indicated by patients Functional Deficits.    [] Progressing: [] Met: [] Not Met: [] Adjusted   - improve Tinetti score to 22/28 or better   [x] Progressing: [] Met: [] Not Met: [] Adjusted   - Stair goal: pt able to do flight of steps with reciprocal pattern and SPC with improved technique   [x] Progressing: [] Met: [] Not Met: [] Adjusted       Overall Progression Towards Functional goals/ Treatment Progress Update:  [x] Patient is progressing as expected towards functional goals listed. [] Progression is slowed due to complexities/Impairments listed. [] Progression has been slowed due to co-morbidities. [] Plan just implemented, too soon to assess goals progression <30days   [] Goals require adjustment due to lack of progress  [] Patient is not progressing as expected and requires additional follow up with physician  [] Patient has met goals as marked above   [] Other    Prognosis for POC: [x] Good [] Fair  [] Poor    Patient requires continued skilled intervention: [x] Yes  [] No    Treatment/Activity Tolerance:  [x] Patient able to complete treatment  [] Patient limited by fatigue  [] Patient limited by pain    [] Patient limited by other medical complications  [] Other:         PLAN:   [x] Continue per plan of care [] Alter current plan (see comments above)  [] Plan of care initiated [] Hold pending MD visit [] Discharge          Therapeutic Exercise and NMR EXR  [x] (68277) Provided verbal/tactile cueing for activities related to strengthening, flexibility, endurance, ROM  for improvements in proximal strength and core control with self care, mobility, lifting and ambulation. [x] (50622) Provided verbal/tactile cueing for activities related to improving balance, coordination, kinesthetic sense, posture, motor skill, proprioception  to assist with core control in self care, mobility, lifting, and ambulation.      Therapeutic Activities and Gait:    [] (93514 or 91986) Provided verbal/tactile cueing for activities related to improving balance, coordination, kinesthetic sense, posture, motor skill, proprioception and motor activation to allow for proper function  with self care and ADLs  [] (22470) Provided training and instruction to the patient for proper core and proximal hip recruitment and positioning with ambulation re-education     Home Exercise Program:    [x] (12269) Reviewed/Progressed HEP activities related to strengthening, flexibility, endurance, ROM of core, proximal hip and LE for functional self-care, mobility, lifting and ambulation   [] (38830) Reviewed/Progressed HEP activities related to improving balance, coordination, kinesthetic sense, posture, motor skill, proprioception of core, proximal hip and LE for self care, mobility, lifting, and ambulation      Manual Treatments:  PROM / STM / Oscillations-Mobs:  G-I, II, III, IV (PA's, Inf., Post.)  [] (19656) Provided manual therapy to mobilize soft tissue/joints for the purpose of modulating pain, promoting relaxation,  increasing ROM, reducing/eliminating soft tissue swelling/inflammation/restriction, improving soft tissue extensibility and allowing for proper ROM for normal function with self care, mobility, lifting and ambulation. CRP:  [] (27970) Canalith Repositioning procedure for the assessment, treatment and education of BPPV    Modalities:   [] Ultrasound   [] Estim    [] Mechanical traction    [] Vaso-pneumatic device   [] Ionto     Charges:  Timed Code Treatment Minutes: 65   Total Treatment Minutes: 65     Medicare Cap total YTD:      [x]N/A      [] $   Workers Comp Time Stamp      (Per CPT and Total Treatment)    Time In: 1337 am   Time Out: 0313 am       [] EVAL     [] Dry Needling  [x] AZ(00353)   x  2  [] EStim Unattended 89822  [x] NMR (82202)  x  2  [] Estim Attended  00612  [] Manual (54037)  x    [] Mechanical Txn 96933  [] TA    x     [] Ultrasound  [] Gait   x                 [] Vaso  [] CRP    [] Ionto           [] Other:      Electronically signed by:   Malik Liu PT, OMT-C, [id number]       Note: If patient does not return for scheduled/ recommended follow up visits, this note will serve as a discharge from care along with most recent update on progress.

## 2022-12-19 ENCOUNTER — HOSPITAL ENCOUNTER (OUTPATIENT)
Dept: PHYSICAL THERAPY | Age: 32
Setting detail: THERAPIES SERIES
Discharge: HOME | End: 2022-12-19
Payer: COMMERCIAL

## 2022-12-19 PROCEDURE — 97110 THERAPEUTIC EXERCISES: CPT

## 2022-12-19 PROCEDURE — 97116 GAIT TRAINING THERAPY: CPT

## 2022-12-19 NOTE — FLOWSHEET NOTE
Physical Therapy Daily Treatment Note    [x]Daily Treatment Note    []Progress Note    [] Discharge Summary          Date:  2022    Patient Name:  Reina Fleming  \"RLICC\"  :  1990  MRN: [id number]      Medical/Treatment Diagnosis Information:  Diagnosis: B58.566V, E25.628X, S14.153A  Treatment Diagnosis: LE weakness, abnormality of gait, imbalance    Insurance/Certification information:  PT Insurance Information: [facility] approved for 2x/week x 8 weeks (16 visits) from 10/3/22 to 22    Physician Information:  Referring Provider (secondary): Dr. Jose Young of care sent to provider:      [x]Faxed   []Co-signature    (attempts: 1[x]  10/27/22   2[] 3[])       Plan of care signed :  [x]  Yes  22    [] No  []  Cosign [x]  Fax    Date of Patient follow up with Physician: before end of year, pt will check     Is this a Progress Report:     []  Yes  [x]  No      If Yes:  Date Range for reporting period:  Beginning 10/27/22  PN: 22     Progress report will be due (10 Rx or 30 days whichever is less):       Recertification will be due (POC Duration  / 90 days whichever is less): 23 or  visit         Visit # Insurance Allowable Auth Required     1 visit until 10/3/22  New C9 approved for 2x/week x 8 weeks from 10/3/22 to 22  C9 extended until 22 [x]  Yes []  No        Functional Scale/Score:    Measure Used:     LEFS  Score:      4780            Date Assessed:  11/3/22       Measure Used:     LEFS  Score:      4480            Date Assessed:  22      Latex Allergy:  [x]NO      []YES  Preferred Language for Healthcare:   [x]English       []other:    RESTRICTIONS/PRECAUTIONS:  SCI, neck surgery with fusion C3-7     SUBJECTIVE:    Feel fine today. Feels that his spasticity limited his ability to walk with TKE at times.           Patient goal for therapy:   \"to get back to work and get stronger with legs and walking\"      Pain level:  no complaints of pain   At eval:    intermittent pressure lower cervical and into traps  Patient reports pain is  1/10 pain at present and  3/10 pain at its worst.    Plan Moving Forward/ For next visit:    Progress trunk strength and stability  Progress LE strength and flexibility  Balance, gait, steps as able        OBJECTIVE:      Exercises/Interventions:     Exercises in bold performed in department today. Items not bolded are carried forward from prior visits for continuity of the record.   Exercise/Equipment hold sets reps resistance Comments/cues HEP              THERAPEUTIC EXERCISE      []   Nustep seat 11 arms 11    Level 6 11 minutes, subjective info taken during ex x 5 min     Elliptical     Level 1 4 minutes fwd, 4 min bwd,  Stationary handles, on/off with LLE, CGA, pt fatigued quickly with RLE     SLR supine  2 5  Cues for quad set, ankle DF and then lift, more difficult for pt [x]   Bridges with tband for glut med activation 5 3 10  Pt has red band at home, PT placed red band in cabinet with name [x]   Standing heel raises  2 15   [x]   Standing hip abduction B      Can step to R if unable to do kick  [x]   Seated ankle dorsiflexion on R LE  2 15 Red Theraband  [x]   Sidelying clamshells- added tband today to L leg only today, R leg without band  2 15 Red tband Cues to keep pelvis level [x]   Prone quadriceps stretch 30 1 3  3 repetitions on each side []   Modified plank 15  3  Cues for form   [x]     Attempted side plank modified      Unable to perform with good technique so did not complete     LAQ   2 10        Standing gastroc stretch  30  5   [x]      Seated hamstring stretch   30  3   [x]   NEUROMUSCULAR RE-ED      []     Bird Dog (just did arms today due to poor form)   1 20   []     Bridges with legs on [employer]  2 20   []     Hamstring Curls on [employer]   1 20  Pt does hamstring curls at gym  []    Marching on [employer] with B UE support   2 15  Pt relying heavily on his arms for support []     LAQ with B UE support on [employer]  2 10  Pt relying heavily on his arms for support []     Toe Taps to the cone in // bars  1 10  BUE support and CGA from PT with gait belt  []    Reciprocal Toe Taps to the cone in // bars   1 10  BUE support and CGA from PT with gait belt []   Static stance balance on blue foam in // bars 30 2   UE hovering with SBA from PT with gait belt  []    Mini Lunges  1 10  BUE support and CGA from PT with gait belt  []    Step ups   1 10  6\" step, single UE with both sides    Lateral step ups and overs   1 10  6\" step, BUEs     Sidestepping in // bars    2 lap  up and back, BUEs, cues for glut activation on stance leg     Lateral step downs with heel tap      2\" with BUEs, cues for control of stance knee and avoiding hyperextension      Step taps to 6\" step   1 15  BUES, R side very difficult    squats in // bars   2 10  BUEs, cues for technique    Stance with one foot on 6\" step and other on floor, done B  30 sec  2  No UEs     Standing balance activities firm surface     Feet apart, feet together, feet half tandem    Conditions 1-6  CGA    Standing balance activities foam surface      Feet together and half tandem, conditions 1-6  CGA    Standing hip abduction off 2\" step   2 10  BUES, cues to keep pelvis level and glut activation on stance leg    1 foot ground + 1 foot airex  -balance  -chest pass  -OH lift  -rot taps   30\" B   1  2  2  2     10 B  10 B  10 B      One set with L foot on airex and one set with R                Therapeutic Exercise/Home Exercise Program:   30 minutes ( [id number])  Warm up and stretching    HEP has been established, See above, reviewed, progressed, pt given handouts of new exs   Discussed massage therapy, PT suggested finding a chiropractic office since they usually have licensed massage therapists and may be able to accept his [facility] as payment   Discussed spinal decompression and what purpose pt is trying to achieve.   Pt inst NOT to do any traction or decompression on his neck. Pt states he just wants to stretch out his back. PT explained that we cannot provide traction to his back, as we are not treating him for a back diagnosis under [facility]. Briefly discussed inversion table may be able to give what pt is wanting but pt should check with his MD first.   pt to bring in his ex folder each visit     Discussed with pt that we want to do skilled therapy here in the dept and work on things he necessarily is not or cannot do in the gym by himself. Reassessed for progress note:   Measurements in bold below taken 11/23/22, non-bolded items taken from evaluation and for comparison purposes. Movement Left MMT Right MMT   AROM        PROM   Hip Flexion 3-  3- 2  2       Hip Extension  2+   tested in prone 2+  Tested in prone         Hip Abduction 2+  2+ to 3-   Tested in SL 2+  2+  Tested in SL       Hip Adduction 4  2 in SL  (1st visit MMT not correct)     4  2+ in SL   (1st visit MMT not correct)       Hip Internal Rotation           Hip External Rotation 4   4-       Knee Flexion 5  4  Tested in prone 4-  3-   Tested in prone       Knee Extension 5  5 4  4+ in available range-tight HS)         Ankle Dorsiflexion 4+  5 2+  2+       Ankle Plantarflexion 2+  2+ 3-  3-       Ankle Inversion 4+  5 3-  3-       Ankle Eversion 4+  5 3-  3-       Great toe extension              Therapeutic Activity:  0 minutes     Gait:  10 minutes: (8138-5256)  Gait without AD, CGA to ensure safety with balance during ambulation: 150 ft x 2  Gait with SPC: heels close, good step length B, slow nimo, discontinuous steps  Gait without SPC: heels apart, decreased step length on L, slower nimo  Steps: pt able to ascend with reciprocal pattern and 1 rail. Able to descend with reciprocal pattern and 1-2 rails. Gait: pt ambulates with SPC, slow nimo, decreased knee extension B, forward posture.  He states that his right knee \"locks\" on him from time to time with walking. It did not do it initially after proximal stabilization exercises (Neuromuscular Re-Education) but then happened. Pt with decreased right ankle dorsiflexion with gait due to weakness noted       Neuromuscular Re-Education:  0 minutes   See above       Canalith Repositioning Procedure:  0 minutes     Manual Therapy:        Modalities: 0 minutes        ASSESSMENT:  Added LAQ and educated Pt on performing slowly to prevent spasticity in RLE hamstrings from limiting motion. Pt educated on performing TKE machine at gym with eccentric quad control. Therapeutic seated rest breaks provided d/t musculare fatigue with ambulation and elliptical.Continue to work on strengthening of quad control in order to control TKE during stance phase and prevent knees from buckling during ambulation. GOALS: Goals established 10/27/22     Patient stated goal:  \"to get back to work and get stronger with legs and walking\"  [x] Progressing: [] Met: [] Not Met: [] Adjusted    Therapist goals for Patient:   Short Term Goals: To be achieved in:  4 weeks   - Independent in HEP and progression per patient tolerance, in order to prevent re-injury. [] Progressing: [x] Met: [] Not Met: [] Adjusted  - Improve strength by 1/3 grade in weak areas to allow for proper functional mobility as indicated by patients Functional Deficits. [x] Progressing: [] Met: [] Not Met: [] Adjusted      Long Term Goals: To be achieved in:  8 weeks   - LEFS score 60/80 or better to assist with reaching prior level of function. [] Progressing: [] Met: [] Not Met: [] Adjusted     [x] NOT APPLICABLE    - Patient will demonstrate increased AROM to [facility] to allow for proper joint functioning as indicated by patients Functional Deficits. [x] Progressing: [] Met: [] Not Met: [] Adjusted   - Improve strength by 2/3 grade or better in weak areas to allow for proper functional mobility as indicated by patients Functional Deficits.    [] Progressing: [] sense, posture, motor skill, proprioception and motor activation to allow for proper function  with self care and ADLs  [x] (32530) Provided training and instruction to the patient for proper core and proximal hip recruitment and positioning with ambulation re-education     Home Exercise Program:    [] (06191) Reviewed/Progressed HEP activities related to strengthening, flexibility, endurance, ROM of core, proximal hip and LE for functional self-care, mobility, lifting and ambulation   [] (35239) Reviewed/Progressed HEP activities related to improving balance, coordination, kinesthetic sense, posture, motor skill, proprioception of core, proximal hip and LE for self care, mobility, lifting, and ambulation      Manual Treatments:  PROM / STM / Oscillations-Mobs:  G-I, II, III, IV (PA's, Inf., Post.)  [] (72472) Provided manual therapy to mobilize soft tissue/joints for the purpose of modulating pain, promoting relaxation,  increasing ROM, reducing/eliminating soft tissue swelling/inflammation/restriction, improving soft tissue extensibility and allowing for proper ROM for normal function with self care, mobility, lifting and ambulation.      CRP:  [] (73954) Canalith Repositioning procedure for the assessment, treatment and education of BPPV    Modalities:   [] Ultrasound   [] Estim    [] Mechanical traction    [] Vaso-pneumatic device   [] Ionto     Charges:  Timed Code Treatment Minutes: 40   Total Treatment Minutes: 40     Medicare Cap total YTD:      [x]N/A      [] $   Workers Comp Time Stamp      (Per CPT and Total Treatment)    Time In: 5686 am   Time Out: 8254 am       [] EVAL     [] Dry Needling  [x] OG(30772)   x  2  [] EStim Unattended 13371  [] NMR (68950)  x    [] Estim Attended  66024  [] Manual (94631)  x    [] Mechanical Txn 76930  [] TA    x     [] Ultrasound  [x] Gait   x    1             [] Vaso  [] CRP    [] Ionto           [] Other:      Electronically signed by:   Ligia Rebollar, PT, DPT       Note: If patient does not return for scheduled/ recommended follow up visits, this note will serve as a discharge from care along with most recent update on progress.

## 2022-12-22 ENCOUNTER — HOSPITAL ENCOUNTER (OUTPATIENT)
Dept: PHYSICAL THERAPY | Age: 32
Setting detail: THERAPIES SERIES
Discharge: HOME | End: 2022-12-22
Payer: COMMERCIAL
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
## 2022-12-27 ENCOUNTER — HOSPITAL ENCOUNTER (OUTPATIENT)
Dept: PHYSICAL THERAPY | Age: 32
Discharge: HOME | End: 2022-12-27
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
## 2022-12-29 ENCOUNTER — HOSPITAL ENCOUNTER (OUTPATIENT)
Dept: PHYSICAL THERAPY | Age: 32
Setting detail: THERAPIES SERIES
Discharge: HOME | End: 2022-12-29
Payer: COMMERCIAL

## 2022-12-29 NOTE — FLOWSHEET NOTE
[facility]  [address]     [place], [facility]  Phone: [phone]   Fax:   [phone]      Physical Therapy  Cancellation/No-show Note    Patient Name:  Zakiya Anguiano  :  1990   Date:  2022    Cancels to Date: 1  No-shows to Date: 1    For today's appointment patient:  []  Cancelled  []  Rescheduled appointment  [x]  No-show     Reason given by patient:  []  Patient ill  []  Conflicting appointment  []  No transportation    []  Conflict with work  []  No reason given  [x]  Other:     Comments:      Electronically signed by:  Chapincito Turner PT , MPT, OMT-c [id number]
